# Patient Record
Sex: MALE | Race: WHITE | Employment: OTHER | URBAN - METROPOLITAN AREA
[De-identification: names, ages, dates, MRNs, and addresses within clinical notes are randomized per-mention and may not be internally consistent; named-entity substitution may affect disease eponyms.]

---

## 2018-09-27 ENCOUNTER — HOSPITAL ENCOUNTER (OUTPATIENT)
Dept: PREADMISSION TESTING | Age: 68
Discharge: ANOTHER HOSPITAL | End: 2018-09-27
Attending: COLON & RECTAL SURGERY
Payer: COMMERCIAL

## 2018-09-27 VITALS
SYSTOLIC BLOOD PRESSURE: 143 MMHG | DIASTOLIC BLOOD PRESSURE: 59 MMHG | TEMPERATURE: 98.2 F | WEIGHT: 169.09 LBS | RESPIRATION RATE: 16 BRPM | BODY MASS INDEX: 24.21 KG/M2 | HEART RATE: 67 BPM | HEIGHT: 70 IN | OXYGEN SATURATION: 98 %

## 2018-09-27 LAB
ANION GAP SERPL CALC-SCNC: 6 MMOL/L (ref 5–15)
BUN SERPL-MCNC: 15 MG/DL (ref 6–20)
BUN/CREAT SERPL: 14 (ref 12–20)
CALCIUM SERPL-MCNC: 8.7 MG/DL (ref 8.5–10.1)
CHLORIDE SERPL-SCNC: 100 MMOL/L (ref 97–108)
CO2 SERPL-SCNC: 30 MMOL/L (ref 21–32)
CREAT SERPL-MCNC: 1.05 MG/DL (ref 0.7–1.3)
ERYTHROCYTE [DISTWIDTH] IN BLOOD BY AUTOMATED COUNT: 12.5 % (ref 11.5–14.5)
GLUCOSE SERPL-MCNC: 137 MG/DL (ref 65–100)
HCT VFR BLD AUTO: 40.3 % (ref 36.6–50.3)
HGB BLD-MCNC: 14.8 G/DL (ref 12.1–17)
MCH RBC QN AUTO: 34.5 PG (ref 26–34)
MCHC RBC AUTO-ENTMCNC: 36.7 G/DL (ref 30–36.5)
MCV RBC AUTO: 93.9 FL (ref 80–99)
NRBC # BLD: 0 K/UL (ref 0–0.01)
NRBC BLD-RTO: 0 PER 100 WBC
PLATELET # BLD AUTO: 181 K/UL (ref 150–400)
PMV BLD AUTO: 9.3 FL (ref 8.9–12.9)
POTASSIUM SERPL-SCNC: 4.3 MMOL/L (ref 3.5–5.1)
RBC # BLD AUTO: 4.29 M/UL (ref 4.1–5.7)
SODIUM SERPL-SCNC: 136 MMOL/L (ref 136–145)
WBC # BLD AUTO: 4.4 K/UL (ref 4.1–11.1)

## 2018-09-27 PROCEDURE — 93005 ELECTROCARDIOGRAM TRACING: CPT

## 2018-09-27 PROCEDURE — 36415 COLL VENOUS BLD VENIPUNCTURE: CPT | Performed by: COLON & RECTAL SURGERY

## 2018-09-27 PROCEDURE — 80048 BASIC METABOLIC PNL TOTAL CA: CPT | Performed by: COLON & RECTAL SURGERY

## 2018-09-27 PROCEDURE — 85027 COMPLETE CBC AUTOMATED: CPT | Performed by: COLON & RECTAL SURGERY

## 2018-09-27 RX ORDER — SODIUM CHLORIDE, SODIUM LACTATE, POTASSIUM CHLORIDE, CALCIUM CHLORIDE 600; 310; 30; 20 MG/100ML; MG/100ML; MG/100ML; MG/100ML
25 INJECTION, SOLUTION INTRAVENOUS CONTINUOUS
Status: CANCELLED | OUTPATIENT
Start: 2018-09-27

## 2018-09-27 NOTE — PERIOP NOTES
Patient here for PAT appointment for hemorrhoidectomy tomorrow 9/28/18. CBC and BMP drawn and sent. EKG completed. Pt followed at the [facility].  No cardiologist. METS>4. DANIELLE 2 for age and male. Preop orders in.

## 2018-09-28 ENCOUNTER — ANESTHESIA (OUTPATIENT)
Dept: SURGERY | Age: 68
End: 2018-09-28
Payer: COMMERCIAL

## 2018-09-28 ENCOUNTER — ANESTHESIA EVENT (OUTPATIENT)
Dept: SURGERY | Age: 68
End: 2018-09-28
Payer: COMMERCIAL

## 2018-09-28 ENCOUNTER — HOSPITAL ENCOUNTER (OUTPATIENT)
Age: 68
Setting detail: OUTPATIENT SURGERY
Discharge: HOME | End: 2018-09-28
Attending: COLON & RECTAL SURGERY | Admitting: COLON & RECTAL SURGERY
Payer: COMMERCIAL

## 2018-09-28 VITALS
SYSTOLIC BLOOD PRESSURE: 118 MMHG | TEMPERATURE: 97.7 F | HEART RATE: 70 BPM | DIASTOLIC BLOOD PRESSURE: 55 MMHG | RESPIRATION RATE: 15 BRPM | OXYGEN SATURATION: 97 % | HEIGHT: 70 IN | WEIGHT: 164.24 LBS | BODY MASS INDEX: 23.51 KG/M2

## 2018-09-28 DIAGNOSIS — K64.1 GRADE II HEMORRHOIDS: Primary | ICD-10-CM

## 2018-09-28 PROBLEM — K64.9 HEMORRHOIDS: Status: ACTIVE | Noted: 2018-09-28

## 2018-09-28 LAB
ATRIAL RATE: 62 BPM
CALCULATED P AXIS, ECG09: 39 DEGREES
CALCULATED R AXIS, ECG10: 54 DEGREES
CALCULATED T AXIS, ECG11: 30 DEGREES
DIAGNOSIS, 93000: NORMAL
P-R INTERVAL, ECG05: 170 MS
Q-T INTERVAL, ECG07: 388 MS
QRS DURATION, ECG06: 86 MS
QTC CALCULATION (BEZET), ECG08: 393 MS
VENTRICULAR RATE, ECG03: 62 BPM

## 2018-09-28 PROCEDURE — 77030002888 HC SUT CHRMC J&J -A: Performed by: COLON & RECTAL SURGERY

## 2018-09-28 PROCEDURE — 77030021052 HC RNG RETRCTR STAY COOP -A: Performed by: COLON & RECTAL SURGERY

## 2018-09-28 PROCEDURE — 74011250636 HC RX REV CODE- 250/636: Performed by: ANESTHESIOLOGY

## 2018-09-28 PROCEDURE — 76060000032 HC ANESTHESIA 0.5 TO 1 HR: Performed by: COLON & RECTAL SURGERY

## 2018-09-28 PROCEDURE — 77030002986 HC SUT PROL J&J -A: Performed by: COLON & RECTAL SURGERY

## 2018-09-28 PROCEDURE — 76010000138 HC OR TIME 0.5 TO 1 HR: Performed by: COLON & RECTAL SURGERY

## 2018-09-28 PROCEDURE — 74011250636 HC RX REV CODE- 250/636

## 2018-09-28 PROCEDURE — 76210000006 HC OR PH I REC 0.5 TO 1 HR: Performed by: COLON & RECTAL SURGERY

## 2018-09-28 PROCEDURE — 77030014007 HC SPNG HEMSTAT J&J -B: Performed by: COLON & RECTAL SURGERY

## 2018-09-28 PROCEDURE — 77030031139 HC SUT VCRL2 J&J -A: Performed by: COLON & RECTAL SURGERY

## 2018-09-28 PROCEDURE — 74011250637 HC RX REV CODE- 250/637: Performed by: ANESTHESIOLOGY

## 2018-09-28 PROCEDURE — 76210000020 HC REC RM PH II FIRST 0.5 HR: Performed by: COLON & RECTAL SURGERY

## 2018-09-28 PROCEDURE — 77030011640 HC PAD GRND REM COVD -A: Performed by: COLON & RECTAL SURGERY

## 2018-09-28 PROCEDURE — 74011000250 HC RX REV CODE- 250: Performed by: COLON & RECTAL SURGERY

## 2018-09-28 PROCEDURE — 77030018836 HC SOL IRR NACL ICUM -A: Performed by: COLON & RECTAL SURGERY

## 2018-09-28 PROCEDURE — 77030020782 HC GWN BAIR PAWS FLX 3M -B

## 2018-09-28 PROCEDURE — 77030013629 HC ELECTRD NDL STRY -B: Performed by: COLON & RECTAL SURGERY

## 2018-09-28 PROCEDURE — 88305 TISSUE EXAM BY PATHOLOGIST: CPT | Performed by: COLON & RECTAL SURGERY

## 2018-09-28 PROCEDURE — 88304 TISSUE EXAM BY PATHOLOGIST: CPT | Performed by: COLON & RECTAL SURGERY

## 2018-09-28 RX ORDER — OXYCODONE HYDROCHLORIDE 5 MG/1
5 TABLET ORAL
Qty: 40 TAB | Refills: 0 | Status: ON HOLD | OUTPATIENT
Start: 2018-09-28 | End: 2018-12-07

## 2018-09-28 RX ORDER — MIDAZOLAM HYDROCHLORIDE 1 MG/ML
1 INJECTION, SOLUTION INTRAMUSCULAR; INTRAVENOUS AS NEEDED
Status: DISCONTINUED | OUTPATIENT
Start: 2018-09-28 | End: 2018-09-28 | Stop reason: HOSPADM

## 2018-09-28 RX ORDER — MIDAZOLAM HYDROCHLORIDE 1 MG/ML
0.5 INJECTION, SOLUTION INTRAMUSCULAR; INTRAVENOUS
Status: DISCONTINUED | OUTPATIENT
Start: 2018-09-28 | End: 2018-09-28 | Stop reason: HOSPADM

## 2018-09-28 RX ORDER — LIDOCAINE HYDROCHLORIDE 20 MG/ML
INJECTION, SOLUTION EPIDURAL; INFILTRATION; INTRACAUDAL; PERINEURAL AS NEEDED
Status: DISCONTINUED | OUTPATIENT
Start: 2018-09-28 | End: 2018-09-28 | Stop reason: HOSPADM

## 2018-09-28 RX ORDER — SODIUM CHLORIDE, SODIUM LACTATE, POTASSIUM CHLORIDE, CALCIUM CHLORIDE 600; 310; 30; 20 MG/100ML; MG/100ML; MG/100ML; MG/100ML
75 INJECTION, SOLUTION INTRAVENOUS CONTINUOUS
Status: DISCONTINUED | OUTPATIENT
Start: 2018-09-28 | End: 2018-09-28 | Stop reason: HOSPADM

## 2018-09-28 RX ORDER — OXYCODONE AND ACETAMINOPHEN 5; 325 MG/1; MG/1
1 TABLET ORAL AS NEEDED
Status: DISCONTINUED | OUTPATIENT
Start: 2018-09-28 | End: 2018-09-28 | Stop reason: HOSPADM

## 2018-09-28 RX ORDER — SODIUM CHLORIDE 0.9 % (FLUSH) 0.9 %
5-10 SYRINGE (ML) INJECTION AS NEEDED
Status: DISCONTINUED | OUTPATIENT
Start: 2018-09-28 | End: 2018-09-28 | Stop reason: HOSPADM

## 2018-09-28 RX ORDER — LIDOCAINE HYDROCHLORIDE 10 MG/ML
0.1 INJECTION, SOLUTION EPIDURAL; INFILTRATION; INTRACAUDAL; PERINEURAL AS NEEDED
Status: DISCONTINUED | OUTPATIENT
Start: 2018-09-28 | End: 2018-09-28 | Stop reason: HOSPADM

## 2018-09-28 RX ORDER — SODIUM CHLORIDE 0.9 % (FLUSH) 0.9 %
5-10 SYRINGE (ML) INJECTION EVERY 8 HOURS
Status: DISCONTINUED | OUTPATIENT
Start: 2018-09-28 | End: 2018-09-28 | Stop reason: HOSPADM

## 2018-09-28 RX ORDER — FENTANYL CITRATE 50 UG/ML
INJECTION, SOLUTION INTRAMUSCULAR; INTRAVENOUS AS NEEDED
Status: DISCONTINUED | OUTPATIENT
Start: 2018-09-28 | End: 2018-09-28 | Stop reason: HOSPADM

## 2018-09-28 RX ORDER — DIPHENHYDRAMINE HYDROCHLORIDE 50 MG/ML
12.5 INJECTION, SOLUTION INTRAMUSCULAR; INTRAVENOUS AS NEEDED
Status: DISCONTINUED | OUTPATIENT
Start: 2018-09-28 | End: 2018-09-28 | Stop reason: HOSPADM

## 2018-09-28 RX ORDER — HYDROMORPHONE HYDROCHLORIDE 1 MG/ML
0.2 INJECTION, SOLUTION INTRAMUSCULAR; INTRAVENOUS; SUBCUTANEOUS
Status: DISCONTINUED | OUTPATIENT
Start: 2018-09-28 | End: 2018-09-28 | Stop reason: HOSPADM

## 2018-09-28 RX ORDER — FENTANYL CITRATE 50 UG/ML
50 INJECTION, SOLUTION INTRAMUSCULAR; INTRAVENOUS AS NEEDED
Status: DISCONTINUED | OUTPATIENT
Start: 2018-09-28 | End: 2018-09-28 | Stop reason: HOSPADM

## 2018-09-28 RX ORDER — PROPOFOL 10 MG/ML
INJECTION, EMULSION INTRAVENOUS AS NEEDED
Status: DISCONTINUED | OUTPATIENT
Start: 2018-09-28 | End: 2018-09-28 | Stop reason: HOSPADM

## 2018-09-28 RX ORDER — SODIUM CHLORIDE 9 MG/ML
50 INJECTION, SOLUTION INTRAVENOUS CONTINUOUS
Status: DISCONTINUED | OUTPATIENT
Start: 2018-09-28 | End: 2018-09-28 | Stop reason: HOSPADM

## 2018-09-28 RX ORDER — MORPHINE SULFATE 10 MG/ML
2 INJECTION, SOLUTION INTRAMUSCULAR; INTRAVENOUS
Status: DISCONTINUED | OUTPATIENT
Start: 2018-09-28 | End: 2018-09-28 | Stop reason: HOSPADM

## 2018-09-28 RX ORDER — SODIUM CHLORIDE, SODIUM LACTATE, POTASSIUM CHLORIDE, CALCIUM CHLORIDE 600; 310; 30; 20 MG/100ML; MG/100ML; MG/100ML; MG/100ML
25 INJECTION, SOLUTION INTRAVENOUS CONTINUOUS
Status: DISCONTINUED | OUTPATIENT
Start: 2018-09-28 | End: 2018-09-28 | Stop reason: HOSPADM

## 2018-09-28 RX ORDER — PROPOFOL 10 MG/ML
INJECTION, EMULSION INTRAVENOUS
Status: DISCONTINUED | OUTPATIENT
Start: 2018-09-28 | End: 2018-09-28 | Stop reason: HOSPADM

## 2018-09-28 RX ORDER — ONDANSETRON 2 MG/ML
4 INJECTION INTRAMUSCULAR; INTRAVENOUS AS NEEDED
Status: DISCONTINUED | OUTPATIENT
Start: 2018-09-28 | End: 2018-09-28 | Stop reason: HOSPADM

## 2018-09-28 RX ORDER — FENTANYL CITRATE 50 UG/ML
25 INJECTION, SOLUTION INTRAMUSCULAR; INTRAVENOUS
Status: DISCONTINUED | OUTPATIENT
Start: 2018-09-28 | End: 2018-09-28 | Stop reason: HOSPADM

## 2018-09-28 RX ORDER — BUPIVACAINE HYDROCHLORIDE AND EPINEPHRINE 2.5; 5 MG/ML; UG/ML
INJECTION, SOLUTION EPIDURAL; INFILTRATION; INTRACAUDAL; PERINEURAL AS NEEDED
Status: DISCONTINUED | OUTPATIENT
Start: 2018-09-28 | End: 2018-09-28 | Stop reason: HOSPADM

## 2018-09-28 RX ADMIN — SODIUM CHLORIDE, SODIUM LACTATE, POTASSIUM CHLORIDE, AND CALCIUM CHLORIDE 25 ML/HR: 600; 310; 30; 20 INJECTION, SOLUTION INTRAVENOUS at 11:25

## 2018-09-28 RX ADMIN — PROPOFOL 30 MG: 10 INJECTION, EMULSION INTRAVENOUS at 12:17

## 2018-09-28 RX ADMIN — PROPOFOL 50 MG: 10 INJECTION, EMULSION INTRAVENOUS at 12:04

## 2018-09-28 RX ADMIN — FENTANYL CITRATE 25 MCG: 50 INJECTION, SOLUTION INTRAMUSCULAR; INTRAVENOUS at 12:04

## 2018-09-28 RX ADMIN — FENTANYL CITRATE 25 MCG: 50 INJECTION, SOLUTION INTRAMUSCULAR; INTRAVENOUS at 12:10

## 2018-09-28 RX ADMIN — OXYCODONE HYDROCHLORIDE AND ACETAMINOPHEN 1 TABLET: 5; 325 TABLET ORAL at 13:14

## 2018-09-28 RX ADMIN — FENTANYL CITRATE 25 MCG: 50 INJECTION, SOLUTION INTRAMUSCULAR; INTRAVENOUS at 13:08

## 2018-09-28 RX ADMIN — LIDOCAINE HYDROCHLORIDE 60 MG: 20 INJECTION, SOLUTION EPIDURAL; INFILTRATION; INTRACAUDAL; PERINEURAL at 12:04

## 2018-09-28 RX ADMIN — PROPOFOL 100 MCG/KG/MIN: 10 INJECTION, EMULSION INTRAVENOUS at 12:04

## 2018-09-28 NOTE — PERIOP NOTES
Discharge instructions reviewed with patient and his wife. Opportunity for questions/answers given, able to verbalize understanding. Provided with 1 prescription for pain medication. Denies need to void prior to discharge. Sitz bath provided with instructions. PIV removed. Escorted out for discharge home via wheelchair by nurse.
EKG reviewed by BRITTANY Sloan NP, okay to proceed with surgery.
Handoff Report from Operating Room to PACU Report received from ALEXANDRE Tripp RN and Carl Schneider CRNA regarding Mandi Carrion. Surgeon(s): 
Fatemeh Chang MD  And Procedure(s) (LRB): 
TRANSANAL EXCISION OF HEMORRHOIDS AND POLYP (N/A)  confirmed  
with allergies, dressings and rectal gelfoam packing discussed. Anesthesia type, drugs, patient history, complications, estimated blood loss, vital signs, intake and output, and last pain medication, lines and temperature were reviewed.
Patient meets discharge criteria. Phase 2 unavailable at present. Monitors removed. Patient OOB with standby assist & steady gait. Denies dizziness or nausea. Dressed with minimal assist.  Transferred to wheelchair. Amaury need to void. Dressing D&I. Transferred to phase 2 via WC.
Wife in [place] updated via phone.
Alert and oriented x 3, normal mood and affect, no apparent risk to self or others.

## 2018-09-28 NOTE — IP AVS SNAPSHOT
[address] [place] [facility]. 
[phone] Patient: Jacobo Moreland MRN: [id number] DYR:[birth year]/36[id number] A check rowdy indicates which time of day the medication should be taken. My Medications START taking these medications Instructions Each Dose to Equal  
 Morning Noon Evening Bedtime  
 oxyCODONE IR 5 mg immediate release tablet Commonly known as:  Beena Fines Your last dose was: Your next dose is: Take 1 Tab by mouth every four (4) hours as needed for Pain. Max Daily Amount: 30 mg.  
 5 mg CONTINUE taking these medications Instructions Each Dose to Equal  
 Morning Noon Evening Bedtime MULTIVITAMIN PO Your last dose was: Your next dose is: Take 1 Tab by mouth daily. 1 Tab VITAMIN B-12 PO Your last dose was: Your next dose is: Take 1 Tab by mouth daily. 1 Tab VITAMIN E PO Your last dose was: Your next dose is: Take 1 Tab by mouth daily. 1 Tab Where to Get Your Medications Information on where to get these meds will be given to you by the nurse or doctor. ! Ask your nurse or doctor about these medications  
  oxyCODONE IR 5 mg immediate release tablet

## 2018-09-28 NOTE — ANESTHESIA POSTPROCEDURE EVALUATION
Post-Anesthesia Evaluation and Assessment Patient: Darrell Fleming MRN: [id number]  SSN: [id number] YOB: 1950  Age: 77 y.o. Sex: male Cardiovascular Function/Vital Signs Visit Vitals  BP (P) 135/77 (BP 1 Location: Left arm, BP Patient Position: At rest;Head of bed elevated (Comment degrees))  Pulse 67  Temp (P) 36.4 °C (97.5 °F)  Resp 14  
 Ht 5' 10\" (1.778 m)  Wt 74.5 kg (164 lb 3.9 oz)  SpO2 97%  BMI 23.57 kg/m2 Patient is status post general anesthesia for Procedure(s): 
TRANSANAL EXCISION OF HEMORRHOIDS AND POLYP. Nausea/Vomiting: None Postoperative hydration reviewed and adequate. Pain: 
Pain Scale 1: (P) Numeric (0 - 10) (09/28/18 1315) Pain Intensity 1: (P) 2 (09/28/18 1315) Managed Neurological Status:  
Neuro (WDL): (P) Exceptions to WDL (09/28/18 1236) At baseline Mental Status and Level of Consciousness: Arousable Pulmonary Status:  
O2 Device: (P) Room air (09/28/18 1315) Adequate oxygenation and airway patent Complications related to anesthesia: None Post-anesthesia assessment completed. No concerns Signed By: Laverne Miller MD   
 September 28, 2018

## 2018-09-28 NOTE — ANESTHESIA PREPROCEDURE EVALUATION
Anesthetic History PONV Review of Systems / Medical History Patient summary reviewed, nursing notes reviewed and pertinent labs reviewed Pulmonary Within defined limits Neuro/Psych Within defined limits Cardiovascular Within defined limits Exercise tolerance: >4 METS 
  
GI/Hepatic/Renal 
Within defined limits Endo/Other Arthritis Other Findings Physical Exam 
 
Airway Mallampati: II 
TM Distance: 4 - 6 cm Neck ROM: normal range of motion Mouth opening: Normal 
 
 Cardiovascular Regular rate and rhythm,  S1 and S2 normal,  no murmur, click, rub, or gallop Dental 
 
Dentition: Implants Pulmonary Breath sounds clear to auscultation Abdominal 
GI exam deferred Other Findings Anesthetic Plan ASA: 2 Anesthesia type: general 
 
Monitoring Plan: BIS Induction: Intravenous Anesthetic plan and risks discussed with: Patient

## 2018-09-28 NOTE — OP NOTES
Preop dx: hemorrhoids  Postop dx: same  Procedure: Excisional hemorrhoidectomy  Surgeon: Dr. Neto Whitt  Anesthesia: mac +30cc 0.25%marcaine with epi  EBL: 1cc  Specimen: left lateral internal/external thrombosed hemorrhoid, left lateral distal rectal polyp  Complications:  none apparent  Condition: stable to recovery room    Indications: thrombosed internal/external hemorrhoid    The patient was taken to the OR after being consented. After smooth induction of anesthesia, the patient was positioned prone on the table. All extremities were padded. Time out was performed. Local anesthetic was infiltrated for a local block. The patient was found to have 1 hemorrhoids. The left lateral column was addressed first. A large hill mccormick anoscope was inserted. The skin was elevated and a v-shaped incision was made on the perineum. The hemorrhoid was undermined taking care to avoid injury to the underlying sphincter complex. The dissection was taken to the apex of the hemorrhoid and ligated with a 3-0 chromic. The incision was closed using a running locking stitch in the anal canal and a running simple suture on the anal margin. This was hemostatic. A 7mm polyp on a stalk was found in the distal rectum on the left lateral sidewall. This was removed with electrocautery and sent to pathology. Gel foam was applied. Sterile gauze was applied. The instrument and sponge counts were correct x2. The patient tolerated the procedure well and was transferred to the recovery room in stable condition.

## 2018-09-28 NOTE — IP AVS SNAPSHOT
[address] [facility] 
[phone] Patient: Mariella Elena MRN: [id number] HXB:3/99[birth year] About your hospitalization You were admitted on:  September 28, 2018 You last received care in the:  [facility] You were discharged on:  September 28, 2018 Why you were hospitalized Your primary diagnosis was:  Not on File Your diagnoses also included:  Hemorrhoids Follow-up Information Follow up With Details Comments Contact Info Catherine Logan MD   [facility] [address] [phone] 
[phone] Discharge Orders None A check rowdy indicates which time of day the medication should be taken. My Medications START taking these medications Instructions Each Dose to Equal  
 Morning Noon Evening Bedtime  
 oxyCODONE IR 5 mg immediate release tablet Commonly known as:  Vira Reasons Your last dose was: Your next dose is: Take 1 Tab by mouth every four (4) hours as needed for Pain. Max Daily Amount: 30 mg.  
 5 mg CONTINUE taking these medications Instructions Each Dose to Equal  
 Morning Noon Evening Bedtime MULTIVITAMIN PO Your last dose was: Your next dose is: Take 1 Tab by mouth daily. 1 Tab VITAMIN B-12 PO Your last dose was: Your next dose is: Take 1 Tab by mouth daily. 1 Tab VITAMIN E PO Your last dose was: Your next dose is: Take 1 Tab by mouth daily. 1 Tab Where to Get Your Medications Information on where to get these meds will be given to you by the nurse or doctor. ! Ask your nurse or doctor about these medications  
  oxyCODONE IR 5 mg immediate release tablet Opioid Education Prescription Opioids: What You Need to Know: 
 
 
[facility] Discharge Instructions for Ambulatory 23-Hour Surgery Patients 1. Advance to high fiber diet as tolerated. 2. Take Metamucil/Citrucel 1 teaspoon mixed in a glass of water in AM and PM. 
3. Remove dressing at 6PM. 4. Take 2 sitz baths today (warm water baths for 15-20 minutes). Begin four (4) times a day the day after surgery. 5. Apply Nupercainal Ointment (does not need a prescription) to the anal area after sitz baths, place a dry 4x4 gauze over the are between the buttocks. 6. Take pain medication as prescribed. (NO DRIVING WHILE ON PAIN MEDICATION). 7. No lifting any objects weighing more than 40 pounds. 8. No sitting more than 45 minutes without standing, walking, or lying down. 9. You may walk as desired. 10.  Please schedule an appointment in the office within 10-14 days. Call ahead to schedule your appointment [phone]. 11.  Call Exchange [phone] if you have any problems or questions after   hours. 12.  Expect slight bright red blood up to four (4) weeks from surgery. 15.  Stitches are the dissolving type  they do not need removal in the office. 14.  If no bowel movement by 9/30/2018, take 30cc (1 tablespoon) of Milk of Magnesia. Repeat if no results. If still no bowel movement the next day, then call the office. DISCHARGE SUMMARY from Nurse PATIENT INSTRUCTIONS: 
 
 After general anesthesia or intravenous sedation, for 24 hours or while taking prescription Narcotics: · Limit your activities · Do not drive and operate hazardous machinery · Do not make important personal or business decisions · Do  not drink alcoholic beverages · If you have not urinated within 8 hours after discharge, please contact your surgeon on call. Report the following to your surgeon: 
· Excessive pain, swelling, redness or odor of or around the surgical area · Temperature over 100.5 · Nausea and vomiting lasting longer than 4 hours or if unable to take medications · Any signs of decreased circulation or nerve impairment to extremity: change in color, persistent  numbness, tingling, coldness or increase pain · Any questions What to do at Home: A common side effect of anesthesia following surgery is nausea and/or vomiting. In order to decrease symptoms, it is wise to avoid foods that are high in fat, greasy foods, milk products, and spicy foods for the first 24 hours. Acceptable foods for the first 24 hours following surgery include but are not limited to: 
 
? soup 
? broth 
?  toast  
? crackers ? applesauce 
? bananas  
? mashed potatoes, 
? soft or scrambled eggs 
? oatmeal 
?  jello It is important to eat when taking your pain medication. This will help to prevent nausea. If possible, please try to time your meals with your medications. It is very important to stay hydrated following surgery. Sip fluids frequently while awake. Avoid acidic drinks such as citrus juices and soda for 24 hours. Carbonated beverages may cause bloating and gas. Acceptable fluids include: 
 
? water (flavor packets may add variety) ? coffee or tea (in moderation) ? Gatorade ? Allison Galeazzi ? apple juice 
? cranberry juice You are encouraged to cough and deep breathe every hour when awake. This will help to prevent respiratory complications following anesthesia.  You may want to hug a pillow when coughing and sneezing to add additional support to the surgical area and to decrease discomfort if you had abdominal or chest surgery. If you are discharged home with support stockings, you may remove them after 24 hours. Support stockings are used to help prevent blood clots in the legs following surgery. Please take time to review all of your Home Care Instructions and Medication Information sheets provided in your discharge packet. If you have any questions, please contact your surgeons office. Thank you. TO PREVENT AN INFECTION 1. [address] YOUR HANDS 
 
? To prevent infection, good handwashing is the most important thing you or your caregiver can do.   
 
? Wash your hands with soap and water or use the hand  we gave you before you touch any wounds. 2. SHOWER ? Use the antibacterial soap we gave you when you take a shower. ? Shower with this soap until your wounds are healed. ? To reach all areas of your body, you may need someone to help you. ? Dont forget to clean your belly button with every shower. 3.  USE CLEAN SHEETS 
 
? Use freshly cleaned sheets on your bed after surgery. ? To keep the surgery site clean, do not allow pets to sleep with you while your wound is still healing. 4. STOP SMOKING ? Stop smoking, or at least cut back on smoking ? Smoking slows your healing. 5.  CONTROL YOUR BLOOD SUGAR 
 
? High blood sugars slow wound healing. If you are diabetic, control your blood sugar levels before and after your surgery. Oxycodone, Rapid Release (ETH-Oxydose, Oxy IR, Roxicodone) - (By mouth) Why this medicine is used:  
Treats moderate to severe pain. This medicine is a narcotic pain reliever. Contact a nurse or doctor right away if you have: 
· Fast or slow heart beat, shallow breathing, blue lips, skin or fingernails · Anxiety, restlessness, fever, sweating, muscle spasms, twitching, seeing or hearing things that are not there · Extreme weakness, shallow breathing, slow heartbeat · Severe confusion, lightheadedness, dizziness, fainting · Sweating or cold, clammy skin, seizures · Severe constipation, stomach pain, nausea, vomiting Common side effects: · Mild constipation · Sleepiness, tiredness © 2017 [facility] Information is for End User's use only and may not be sold, redistributed or otherwise used for commercial purposes. Please carry medication information at all times in case of emergency situations. These are general instructions for a healthy lifestyle: No smoking/ No tobacco products/ Avoid exposure to second hand smoke Surgeon General's Warning:  Quitting smoking now greatly reduces serious risk to your health. Obesity, smoking, and sedentary lifestyle greatly increases your risk for illness A healthy diet, regular physical exercise & weight monitoring are important for maintaining a healthy lifestyle You may be retaining fluid if you have a history of heart failure or if you experience any of the following symptoms:  Weight gain of 3 pounds or more overnight or 5 pounds in a week, increased swelling in our hands or feet or shortness of breath while lying flat in bed. Please call your doctor as soon as you notice any of these symptoms; do not wait until your next office visit. Recognize signs and symptoms of STROKE: 
 
F-face looks uneven A-arms unable to move or move unevenly S-speech slurred or non-existent T-time-call 911 as soon as signs and symptoms begin-DO NOT go Back to bed or wait to see if you get better-TIME IS BRAIN. Warning Signs of HEART ATTACK Call 911 if you have these symptoms: 
? Chest discomfort. Most heart attacks involve discomfort in the center of the chest that lasts more than a few minutes, or that goes away and comes back. It can feel like uncomfortable pressure, squeezing, fullness, or pain. ? Discomfort in other areas of the upper body. Symptoms can include pain or discomfort in one or both arms, the back, neck, jaw, or stomach. ? Shortness of breath with or without chest discomfort. ? Other signs may include breaking out in a cold sweat, nausea, or lightheadedness. Don't wait more than five minutes to call [address]! Fast action can save your life. Calling 911 is almost always the fastest way to get lifesaving treatment. Emergency Medical Services staff can begin treatment when they arrive  up to an hour sooner than if someone gets to the hospital by car. The discharge information has been reviewed with the patient and caregiver. The patient and caregiver verbalized understanding. Discharge medications reviewed with the patient and caregiver and appropriate educational materials and side effects teaching were provided. Introducing [facility]! [facility] introduces [employer] patient portal. Now you can access parts of your medical record, email your doctor's office, and request medication refills online. 1. In your internet browser, go to [email or web address] 2. Click on the First Time User? Click Here link in the Sign In box. You will see the New Member Sign Up page. 3. Enter your [employer] Access Code exactly as it appears below. You will not need to use this code after youve completed the sign-up process. If you do not sign up before the expiration date, you must request a new code. · [employer] Access Code: [email or web address] Expires: 12/27/2018 10:34 AM 
 
4. Enter the last four digits of your Social Security Number (xxxx) and Date of Birth (mm/dd/yyyy) as indicated and click Submit. You will be taken to the next sign-up page. 5. Create a [employer] ID. This will be your [employer] login ID and cannot be changed, so think of one that is secure and easy to remember. 6. Create a [employer] password. You can change your password at any time. 7. Enter your Password Reset Question and Answer. This can be used at a later time if you forget your password. 8. Enter your e-mail address. You will receive e-mail notification when new information is available in [address] 9. Click Sign Up. You can now view and download portions of your medical record. 10. Click the Download Summary menu link to download a portable copy of your medical information. If you have questions, please visit the Frequently Asked Questions section of the [employer] website. Remember, [employer] is NOT to be used for urgent needs. For medical emergencies, dial 911. Now available from your iPhone and Android! Introducing Humberto Xiao As a [facility] patient, I wanted to make you aware of our electronic visit tool called Humberto Xiao. [employer]Socialthing allows you to connect within minutes with a medical provider 24 hours a day, seven days a week via a mobile device or tablet or logging into a secure website from your computer. You can access Humberto Xiao from anywhere in the [place]. A virtual visit might be right for you when you have a simple condition and feel like you just dont want to get out of bed, or cant get away from work for an appointment, when your regular [facility] provider is not available (evenings, weekends or holidays), or when youre out of town and need minor care. Electronic visits cost only $49 and if the [employer] provider determines a prescription is needed to treat your condition, one can be electronically transmitted to a nearby pharmacy*. Please take a moment to enroll today if you have not already done so. The enrollment process is free and takes just a few minutes. To enroll, please download the [employer] alden to your tablet or phone, or visit [email or web address] to enroll on your computer. And, as an [address] [place] [address] patient with a [employer] account, the results of your visits will be scanned into your electronic medical record and your primary care provider will be able to view the scanned results. We urge you to continue to see your regular [facility] provider for your ongoing medical care. And while your primary care provider may not be the one available when you seek a Humberto Xiao virtual visit, the peace of mind you get from getting a real diagnosis real time can be priceless. For more information on Humberto Xiao, view our Frequently Asked Questions (FAQs) at [email or web address]. Sincerely, 
 
Iain Honeycutt MD 
Chief Medical Officer Refugio Campos *:  certain medications cannot be prescribed via Humberto Connollyzoefin Providers Seen During Your Hospitalization Provider Specialty Primary office phone Devin Darnell MD Colon [facility] Rectal Surgery [phone] Your Primary Care Physician (PCP) Primary Care Physician Office Phone Office Fax Crystal Lupillo [phone] You are allergic to the following No active allergies Recent Documentation Height Weight BMI Smoking Status 1.778 m 74.5 kg 23.57 kg/m2 Never Smoker Emergency Contacts Name Discharge Info Relation Home Work Mobile [facility] DISCHARGE CAREGIVER [3] Spouse [3] [phone] Patient Belongings The following personal items are in your possession at time of discharge: 
  Dental Appliances: None  Visual Aid: Glasses      Home Medications: None   Jewelry: None  Clothing: Undergarments, Pants, Shirt, Footwear, Socks    Other Valuables: Eyeglasses, Other (comment) (book)  Personal Items Sent to Safe: declined Please provide this summary of care documentation to your next provider.  
  
  
 
  
Signatures-by signing, you are acknowledging that this After Visit Summary has been reviewed with you and you have received a copy. Patient Signature:  ____________________________________________________________ Date:  ____________________________________________________________  
  
Lesly Stake Provider Signature:  ____________________________________________________________ Date:  ____________________________________________________________

## 2018-09-28 NOTE — H&P
Colon and Rectal Surgery History and Physical 
 
Subjective:  
  
Gigi Flores is a 77 y.o. male who has hemorrhoids There are no active problems to display for this patient. Past Medical History:  
Diagnosis Date  Arthritis  Cancer [facility])   
 prostate  Ill-defined condition   
 bilateral cataracts  Nausea & vomiting Past Surgical History:  
Procedure Laterality Date  HX ORTHOPAEDIC Bilateral   
 multiple knee surgeries, mostly related to fibrosis  HX PROSTATECTOMY  2005  
 prostatectomy Social History Substance Use Topics  Smoking status: Never Smoker  Smokeless tobacco: Never Used  Alcohol use 3.6 oz/week 6 Glasses of wine per week Family History Problem Relation Age of Onset  Lung Disease Mother   
  lung failure, age 76  Cancer Father   
  throat, smoker, age 64 Prior to Admission medications Medication Sig Start Date End Date Taking? Authorizing Provider MULTIVITAMIN PO Take 1 Tab by mouth daily. Historical Provider  
cyanocobalamin, vitamin B-12, (VITAMIN B-12 PO) Take 1 Tab by mouth daily. Historical Provider  
vitamin E acetate (VITAMIN E PO) Take 1 Tab by mouth daily. Historical Provider No Known Allergies Review of Systems: A comprehensive review of systems was negative except for that written in the History of Present Illness. Objective:  
 
Visit Vitals  /74 (BP 1 Location: Right arm, BP Patient Position: At rest)  Pulse 69  Temp 97.9 °F (36.6 °C)  Resp 18  Ht 5' 10\" (1.778 m)  Wt 74.5 kg (164 lb 3.9 oz)  SpO2 99%  BMI 23.57 kg/m2 Physical Exam:  
nad Chest clear Heart reg 
abd soft Imaging:  images and reports reviewed Lab Review:   
Recent Results (from the past 24 hour(s)) EKG, 12 LEAD, INITIAL Collection Time: 09/27/18  1:13 PM  
Result Value Ref Range Ventricular Rate 62 BPM  
 Atrial Rate 62 BPM  
 P-R Interval 170 ms  QRS Duration 86 ms  
 Q-T Interval 388 ms QTC Calculation (Bezet) 393 ms Calculated P Axis 39 degrees Calculated R Axis 54 degrees Calculated T Axis 30 degrees Diagnosis Sinus rhythm with premature atrial complexes Confirmed by Prateek Eaton ([id number]) on 9/28/2018 3:37:58 AM 
  
METABOLIC PANEL, BASIC Collection Time: 09/27/18  1:29 PM  
Result Value Ref Range Sodium 136 136 - 145 mmol/L Potassium 4.3 3.5 - 5.1 mmol/L Chloride 100 97 - 108 mmol/L  
 CO2 30 21 - 32 mmol/L Anion gap 6 5 - 15 mmol/L Glucose 137 (H) 65 - 100 mg/dL BUN 15 6 - 20 MG/DL Creatinine 1.05 0.70 - 1.30 MG/DL  
 BUN/Creatinine ratio 14 12 - 20 GFR est AA >60 >60 ml/min/1.73m2 GFR est non-AA >60 >60 ml/min/1.73m2 Calcium 8.7 8.5 - 10.1 MG/DL  
CBC W/O DIFF Collection Time: 09/27/18  1:29 PM  
Result Value Ref Range WBC 4.4 4.1 - 11.1 K/uL  
 RBC 4.29 4. 10 - 5.70 M/uL  
 HGB 14.8 12.1 - 17.0 g/dL HCT 40.3 36.6 - 50.3 % MCV 93.9 80.0 - 99.0 FL  
 MCH 34.5 (H) 26.0 - 34.0 PG  
 MCHC 36.7 (H) 30.0 - 36.5 g/dL  
 RDW 12.5 11.5 - 14.5 % PLATELET 439 129 - 546 K/uL MPV 9.3 8.9 - 12.9 FL  
 NRBC 0.0 0  WBC ABSOLUTE NRBC 0.00 0.00 - 0.01 K/uL Labs and radiology: images and reports reviewed Assessment:  
 
hemorrhoids Plan: 1. I recommend proceeding with excisional hemorrhoidectomy. Treatment alternatives were discussed. 2. Discussed aspects of surgical intervention, methods, risks (including by not limited to infection, bleeding, hematoma, and perforation of the intestines or solid organs), and the risks of general anesthetic. The patient understands the risks; any and all questions were answered to the patient's satisfaction. Signed By: Tucker Huose MD   
 September 28, 2018

## 2018-09-28 NOTE — DISCHARGE INSTRUCTIONS
Ben Levin MD, FACS  [place] C. Kisha George MD, FACS  Edwige Maria M. Sue Guzmán MD, [address] Nadine Salazar MD, [address] [facility] Catrachito [place] MD, FACS  Keenan Ogden. MD Kat Strickland MD    [facility] Discharge Instructions for Ambulatory 23-Hour Surgery Patients    1. Advance to high fiber diet as tolerated. 2. Take Metamucil/Citrucel 1 teaspoon mixed in a glass of water in AM and PM.  3. Remove dressing at 6PM.  4. Take 2 sitz baths today (warm water baths for 15-20 minutes). Begin four (4) times a day the day after surgery. 5. Apply Nupercainal Ointment (does not need a prescription) to the anal area after sitz baths, place a dry 4x4 gauze over the are between the buttocks. 6. Take pain medication as prescribed. (NO DRIVING WHILE ON PAIN MEDICATION). 7. No lifting any objects weighing more than 40 pounds. 8. No sitting more than 45 minutes without standing, walking, or lying down. 9. You may walk as desired. 10.  Please schedule an appointment in the office within 10-14 days. Call ahead to schedule your appointment [phone]. 11.  Call [facility] [phone] if you have any problems or questions after   hours. 12.  Expect slight bright red blood up to four (4) weeks from surgery. 13.  Stitches are the dissolving type - they do not need removal in the office. 14.  If no bowel movement by 9/30/2018, take 30cc (1 tablespoon) of Milk of Magnesia. Repeat if no results. If still no bowel movement the next day, then call the office. DISCHARGE SUMMARY from Nurse    PATIENT INSTRUCTIONS:    After general anesthesia or intravenous sedation, for 24 hours or while taking prescription Narcotics:  · Limit your activities  · Do not drive and operate hazardous machinery  · Do not make important personal or business decisions  · Do  not drink alcoholic beverages  · If you have not urinated within 8 hours after discharge, please contact your surgeon on call.     Report the following to your surgeon:  · Excessive pain, swelling, redness or odor of or around the surgical area  · Temperature over 100.5  · Nausea and vomiting lasting longer than 4 hours or if unable to take medications  · Any signs of decreased circulation or nerve impairment to extremity: change in color, persistent  numbness, tingling, coldness or increase pain  · Any questions    What to do at Home:  A common side effect of anesthesia following surgery is nausea and/or vomiting. In order to decrease symptoms, it is wise to avoid foods that are high in fat, greasy foods, milk products, and spicy foods for the first 24 hours. Acceptable foods for the first 24 hours following surgery include but are not limited to:     soup   broth    toast    crackers    applesauce    bananas    mashed potatoes,   soft or scrambled eggs   oatmeal    jello    It is important to eat when taking your pain medication. This will help to prevent nausea. If possible, please try to time your meals with your medications. It is very important to stay hydrated following surgery. Sip fluids frequently while awake. Avoid acidic drinks such as citrus juices and soda for 24 hours. Carbonated beverages may cause bloating and gas. Acceptable fluids include:    - water (flavor packets may add variety)  - coffee or tea (in moderation)  - Gatorade  - Maicol-aid  - apple juice  - cranberry juice    You are encouraged to cough and deep breathe every hour when awake. This will help to prevent respiratory complications following anesthesia. You may want to hug a pillow when coughing and sneezing to add additional support to the surgical area and to decrease discomfort if you had abdominal or chest surgery. If you are discharged home with support stockings, you may remove them after 24 hours. Support stockings are used to help prevent blood clots in the legs following surgery.     Please take time to review all of your Home Care Instructions and Medication Information sheets provided in your discharge packet. If you have any questions, please contact your surgeons office. Thank you. TO PREVENT AN INFECTION      1. [address] YOUR HANDS     To prevent infection, good handwashing is the most important thing you or your caregiver can do.  Wash your hands with soap and water or use the hand  we gave you before you touch any wounds. 2. SHOWER     Use the antibacterial soap we gave you when you take a shower.  Shower with this soap until your wounds are healed.  To reach all areas of your body, you may need someone to help you.  Dont forget to clean your belly button with every shower. 3.  USE CLEAN SHEETS     Use freshly cleaned sheets on your bed after surgery.  To keep the surgery site clean, do not allow pets to sleep with you while your wound is still healing. 4. STOP SMOKING     Stop smoking, or at least cut back on smoking     Smoking slows your healing. 5.  CONTROL YOUR BLOOD SUGAR     High blood sugars slow wound healing. If you are diabetic, control your blood sugar levels before and after your surgery. Oxycodone, Rapid Release (ETH-Oxydose, Oxy IR, Roxicodone) - (By mouth)   Why this medicine is used:   Treats moderate to severe pain. This medicine is a narcotic pain reliever.   Contact a nurse or doctor right away if you have:  · Fast or slow heart beat, shallow breathing, blue lips, skin or fingernails  · Anxiety, restlessness, fever, sweating, muscle spasms, twitching, seeing or hearing things that are not there  · Extreme weakness, shallow breathing, slow heartbeat  · Severe confusion, lightheadedness, dizziness, fainting  · Sweating or cold, clammy skin, seizures  · Severe constipation, stomach pain, nausea, vomiting     Common side effects:  · Mild constipation  · Sleepiness, tiredness  © 2017 [facility] Information is for End User's use only and may not be sold, redistributed or otherwise used for commercial purposes. Please carry medication information at all times in case of emergency situations. These are general instructions for a healthy lifestyle:    No smoking/ No tobacco products/ Avoid exposure to second hand smoke  Surgeon General's Warning:  Quitting smoking now greatly reduces serious risk to your health. Obesity, smoking, and sedentary lifestyle greatly increases your risk for illness    A healthy diet, regular physical exercise & weight monitoring are important for maintaining a healthy lifestyle    You may be retaining fluid if you have a history of heart failure or if you experience any of the following symptoms:  Weight gain of 3 pounds or more overnight or 5 pounds in a week, increased swelling in our hands or feet or shortness of breath while lying flat in bed. Please call your doctor as soon as you notice any of these symptoms; do not wait until your next office visit. Recognize signs and symptoms of STROKE:    F-face looks uneven    A-arms unable to move or move unevenly    S-speech slurred or non-existent    T-time-call 911 as soon as signs and symptoms begin-DO NOT go       Back to bed or wait to see if you get better-TIME IS BRAIN. Warning Signs of HEART ATTACK     Call 911 if you have these symptoms:   Chest discomfort. Most heart attacks involve discomfort in the center of the chest that lasts more than a few minutes, or that goes away and comes back. It can feel like uncomfortable pressure, squeezing, fullness, or pain.  Discomfort in other areas of the upper body. Symptoms can include pain or discomfort in one or both arms, the back, neck, jaw, or stomach.  Shortness of breath with or without chest discomfort.  Other signs may include breaking out in a cold sweat, nausea, or lightheadedness. Don't wait more than five minutes to call 911 - MINUTES MATTER! Fast action can save your life.  Calling 911 is almost always the fastest way to get lifesaving treatment. Emergency Medical Services staff can begin treatment when they arrive -- up to an hour sooner than if someone gets to the hospital by car. The discharge information has been reviewed with the patient and caregiver. The patient and caregiver verbalized understanding. Discharge medications reviewed with the patient and caregiver and appropriate educational materials and side effects teaching were provided.

## 2018-09-28 NOTE — BRIEF OP NOTE
BRIEF OPERATIVE NOTE Date of Procedure: 9/28/2018 Preoperative Diagnosis: PROLAPSING HEMORRHOIDS Postoperative Diagnosis: * No post-op diagnosis entered * Procedure(s): HEMORRHOIDECTOMY Surgeon(s) and Role: 
   * Washington David MD - Primary Surgical Assistant: Temo Landaverde Surgical Staff: 
Circ-1: Deniz Flores; Karthikeyan Martinez RN Scrub Tech-1: Cezar Billings Scrub Tech-Relief: Temo Landaverde Surg Asst-Relief: Temo Landaverde Event Time In Incision Start 21  Incision Close Anesthesia: MAC Estimated Blood Loss: 1cc Specimens: * No specimens in log * Findings: left lateral thrombosed internal/external hemorrhoid, left lateral distal rectal polyp Complications: none apparent Implants: * No implants in log *

## 2018-12-07 ENCOUNTER — ANESTHESIA (OUTPATIENT)
Dept: ENDOSCOPY | Age: 68
End: 2018-12-07
Payer: COMMERCIAL

## 2018-12-07 ENCOUNTER — ANESTHESIA EVENT (OUTPATIENT)
Dept: ENDOSCOPY | Age: 68
End: 2018-12-07
Payer: COMMERCIAL

## 2018-12-07 ENCOUNTER — HOSPITAL ENCOUNTER (OUTPATIENT)
Age: 68
Setting detail: OUTPATIENT SURGERY
Discharge: HOME | End: 2018-12-07
Attending: COLON & RECTAL SURGERY | Admitting: COLON & RECTAL SURGERY
Payer: COMMERCIAL

## 2018-12-07 VITALS
HEART RATE: 61 BPM | RESPIRATION RATE: 18 BRPM | BODY MASS INDEX: 23.62 KG/M2 | DIASTOLIC BLOOD PRESSURE: 72 MMHG | HEIGHT: 70 IN | WEIGHT: 165 LBS | OXYGEN SATURATION: 98 % | TEMPERATURE: 97.6 F | SYSTOLIC BLOOD PRESSURE: 115 MMHG

## 2018-12-07 PROCEDURE — 77030027957 HC TBNG IRR ENDOGTR BUSS -B: Performed by: COLON & RECTAL SURGERY

## 2018-12-07 PROCEDURE — 88305 TISSUE EXAM BY PATHOLOGIST: CPT

## 2018-12-07 PROCEDURE — 77030013992 HC SNR POLYP ENDOSC BSC -B: Performed by: COLON & RECTAL SURGERY

## 2018-12-07 PROCEDURE — 76040000019: Performed by: COLON & RECTAL SURGERY

## 2018-12-07 PROCEDURE — 74011250636 HC RX REV CODE- 250/636

## 2018-12-07 PROCEDURE — 76060000031 HC ANESTHESIA FIRST 0.5 HR: Performed by: COLON & RECTAL SURGERY

## 2018-12-07 PROCEDURE — 74011250636 HC RX REV CODE- 250/636: Performed by: COLON & RECTAL SURGERY

## 2018-12-07 RX ORDER — SODIUM CHLORIDE 0.9 % (FLUSH) 0.9 %
5-10 SYRINGE (ML) INJECTION EVERY 8 HOURS
Status: DISCONTINUED | OUTPATIENT
Start: 2018-12-07 | End: 2018-12-07 | Stop reason: HOSPADM

## 2018-12-07 RX ORDER — LIDOCAINE HYDROCHLORIDE 20 MG/ML
INJECTION, SOLUTION EPIDURAL; INFILTRATION; INTRACAUDAL; PERINEURAL AS NEEDED
Status: DISCONTINUED | OUTPATIENT
Start: 2018-12-07 | End: 2018-12-07 | Stop reason: HOSPADM

## 2018-12-07 RX ORDER — PROPOFOL 10 MG/ML
INJECTION, EMULSION INTRAVENOUS AS NEEDED
Status: DISCONTINUED | OUTPATIENT
Start: 2018-12-07 | End: 2018-12-07 | Stop reason: HOSPADM

## 2018-12-07 RX ORDER — DEXTROMETHORPHAN/PSEUDOEPHED 2.5-7.5/.8
1.2 DROPS ORAL
Status: DISCONTINUED | OUTPATIENT
Start: 2018-12-07 | End: 2018-12-07 | Stop reason: HOSPADM

## 2018-12-07 RX ORDER — FLUMAZENIL 0.1 MG/ML
0.2 INJECTION INTRAVENOUS
Status: DISCONTINUED | OUTPATIENT
Start: 2018-12-07 | End: 2018-12-07 | Stop reason: HOSPADM

## 2018-12-07 RX ORDER — SODIUM CHLORIDE 9 MG/ML
INJECTION, SOLUTION INTRAVENOUS
Status: DISCONTINUED | OUTPATIENT
Start: 2018-12-07 | End: 2018-12-07 | Stop reason: HOSPADM

## 2018-12-07 RX ORDER — SODIUM CHLORIDE 9 MG/ML
50 INJECTION, SOLUTION INTRAVENOUS CONTINUOUS
Status: DISCONTINUED | OUTPATIENT
Start: 2018-12-07 | End: 2018-12-07 | Stop reason: HOSPADM

## 2018-12-07 RX ORDER — SODIUM CHLORIDE 0.9 % (FLUSH) 0.9 %
5-10 SYRINGE (ML) INJECTION AS NEEDED
Status: DISCONTINUED | OUTPATIENT
Start: 2018-12-07 | End: 2018-12-07 | Stop reason: HOSPADM

## 2018-12-07 RX ORDER — NALOXONE HYDROCHLORIDE 0.4 MG/ML
0.4 INJECTION, SOLUTION INTRAMUSCULAR; INTRAVENOUS; SUBCUTANEOUS
Status: DISCONTINUED | OUTPATIENT
Start: 2018-12-07 | End: 2018-12-07 | Stop reason: HOSPADM

## 2018-12-07 RX ORDER — EPINEPHRINE 0.1 MG/ML
1 INJECTION INTRACARDIAC; INTRAVENOUS
Status: DISCONTINUED | OUTPATIENT
Start: 2018-12-07 | End: 2018-12-07 | Stop reason: HOSPADM

## 2018-12-07 RX ORDER — ATROPINE SULFATE 0.1 MG/ML
0.5 INJECTION INTRAVENOUS
Status: DISCONTINUED | OUTPATIENT
Start: 2018-12-07 | End: 2018-12-07 | Stop reason: HOSPADM

## 2018-12-07 RX ADMIN — PROPOFOL 40 MG: 10 INJECTION, EMULSION INTRAVENOUS at 08:35

## 2018-12-07 RX ADMIN — SODIUM CHLORIDE: 9 INJECTION, SOLUTION INTRAVENOUS at 08:16

## 2018-12-07 RX ADMIN — PROPOFOL 50 MG: 10 INJECTION, EMULSION INTRAVENOUS at 08:22

## 2018-12-07 RX ADMIN — PROPOFOL 30 MG: 10 INJECTION, EMULSION INTRAVENOUS at 08:18

## 2018-12-07 RX ADMIN — PROPOFOL 40 MG: 10 INJECTION, EMULSION INTRAVENOUS at 08:36

## 2018-12-07 RX ADMIN — PROPOFOL 50 MG: 10 INJECTION, EMULSION INTRAVENOUS at 08:26

## 2018-12-07 RX ADMIN — LIDOCAINE HYDROCHLORIDE 40 MG: 20 INJECTION, SOLUTION EPIDURAL; INFILTRATION; INTRACAUDAL; PERINEURAL at 08:16

## 2018-12-07 RX ADMIN — PROPOFOL 50 MG: 10 INJECTION, EMULSION INTRAVENOUS at 08:16

## 2018-12-07 NOTE — OP NOTES
Colonoscopy Procedure Note    Indications: Previous adenomatous polyp    Anesthesia/Sedation: MAC anesthesia Propofol    Pre-Procedure Exam:  Airway: clear   Heart: normal S1and S2    Lungs: clear bilateral  Abdomen: soft, nontender, bowel sounds present and normal in all quadrants   Mental Status: awake, alert, and oriented to person, place, and time      Procedure in Detail:  Informed consent was obtained for the procedure, including sedation. Risks of perforation, hemorrhage, adverse drug reaction, and aspiration were discussed. The patient was placed in the left lateral decubitus position. Based on the pre-procedure assessment, including review of the patient's medical history, medications, allergies, and review of systems, he had been deemed to be an appropriate candidate for moderate sedation; he was therefore sedated with the medications listed above. The patient was monitored continuously with ECG tracing, pulse oximetry, blood pressure monitoring, and direct observations. A rectal examination was performed. The [id number] was inserted into the rectum and advanced under direct vision to the cecum, which was identified by the ileocecal valve and appendiceal orifice. The quality of the colonic preparation was excellent. A careful inspection was made as the colonoscope was withdrawn, including a retroflexed view of the rectum; findings and interventions are described below. Appropriate photodocumentation was obtained.     Findings:   Rectum:   Normal  Sigmoid:     - Excavated lesions:     - Diverticulosis  Descending Colon:     - Excavated lesions:     - Diverticulosis  Transverse Colon:     - Excavated lesions:     - Diverticulosis    - Protruding lesions:     -Pedunculated Polyp(s) size 5 mm removed by polypectomy (hot biopsy)  Ascending Colon:     - Protruding lesions:     -Pedunculated Polyp(s) size 7 mm removed by polypectomy (snare cautery)  Cecum:   Normal          Specimens: Specimens were collected and sent to pathology. EBL: None    Complications: None; patient tolerated the procedure well. Attending Attestation: I performed the procedure.     Recommendations:    - repeat colonoscopy in 1 year    Signed By: Michael Renee MD                        December 7, 2018

## 2018-12-07 NOTE — BRIEF OP NOTE
BRIEF OPERATIVE NOTE Date of Procedure: 12/7/2018 Preoperative Diagnosis: HISTORY OF COLON POLYPS Postoperative Diagnosis: diverticulosis, colon polyps Procedure(s): 
COLONOSCOPY 
ENDOSCOPIC POLYPECTOMY Surgeon(s) and Role: Elder Etienne MD - Primary Surgical Assistant:  
 
Surgical Staff: 
Endoscopy Technician-1: Patrick Davila Endoscopy RN-1: Michell Ellsworth RN No case tracking events are documented in the log. Anesthesia: MAC Estimated Blood Loss: none Specimens:  
ID Type Source Tests Collected by Time Destination 1 : pathology Preservative Colon, Ascending  Elder Etienne MD 12/7/2018 3883 Pathology 2 : pathology Preservative Colon, Transverse  Elder Etienne MD 12/7/2018 9404 Pathology Findings: 2 polyps, pandiverticulosis Complications: none apparent Implants: * No implants in log *

## 2018-12-07 NOTE — PROGRESS NOTES
Endoscope was pre-cleaned at bedside immediately following procedure by Mariela Mccord. Priscilla Napier

## 2018-12-07 NOTE — ANESTHESIA POSTPROCEDURE EVALUATION
Procedure(s): 
COLONOSCOPY 
ENDOSCOPIC POLYPECTOMY. 
 
<BSHSIANPOST> Visit Vitals /72 Pulse 61 Temp 36.4 °C (97.6 °F) Resp 18 Ht 5' 10\" (1.778 m) Wt 74.8 kg (165 lb) SpO2 98% BMI 23.68 kg/m²

## 2018-12-07 NOTE — H&P
Colon and Rectal Surgery History and Physical 
 
Subjective:  
  
Jacobo Moreland is a 77 y.o. male who has hx dwain Patient Active Problem List  
 Diagnosis Date Noted  Hemorrhoids 09/28/2018 Past Medical History:  
Diagnosis Date  Arthritis  Cancer [facility])   
 prostate  Ill-defined condition   
 bilateral cataracts  Nausea & vomiting Past Surgical History:  
Procedure Laterality Date  HX ORTHOPAEDIC Bilateral   
 multiple knee surgeries, mostly related to fibrosis  HX PROSTATECTOMY  2005  
 prostatectomy Social History Tobacco Use  Smoking status: Never Smoker  Smokeless tobacco: Never Used Substance Use Topics  Alcohol use: Yes Alcohol/week: 3.6 oz Types: 6 Glasses of wine per week Family History Problem Relation Age of Onset  Lung Disease Mother   
     lung failure, age 76  Cancer Father   
     throat, smoker, age 64 Prior to Admission medications Medication Sig Start Date End Date Taking? Authorizing Provider MULTIVITAMIN PO Take 1 Tab by mouth daily. Yes Provider, Historical  
cyanocobalamin, vitamin B-12, (VITAMIN B-12 PO) Take 1 Tab by mouth daily. Yes Provider, Historical  
vitamin E acetate (VITAMIN E PO) Take 1 Tab by mouth daily. Yes Provider, Historical  
 
No Known Allergies Review of Systems: A comprehensive review of systems was negative except for that written in the History of Present Illness. Objective:  
 
Visit Vitals /76 Pulse 61 Temp 97.6 °F (36.4 °C) Resp 16 Ht 5' 10\" (1.778 m) Wt 74.8 kg (165 lb) SpO2 96% BMI 23.68 kg/m² Physical Exam:  
nad Chest clear Heart reg 
abd soft Imaging:  images and reports reviewed Lab Review:  No results found for this or any previous visit (from the past 24 hour(s)). Labs and radiology: images and reports reviewed Assessment: Hx dwain Plan: 1. I recommend proceeding with colonoscopy. Treatment alternatives were discussed. 2. Discussed aspects of surgical intervention, methods, risks (including by not limited to infection, bleeding, hematoma, and perforation of the intestines or solid organs), and the risks of general anesthetic. The patient understands the risks; any and all questions were answered to the patient's satisfaction. Signed By: Sarath Mcfadden MD   
 December 7, 2018

## 2018-12-07 NOTE — ANESTHESIA PREPROCEDURE EVALUATION
Anesthesia Evaluation Physical Exam 
 
Airway Mallampati: II 
TM Distance: > 6 cm Neck ROM: normal range of motion Mouth opening: Normal 
 
 Cardiovascular Regular rate and rhythm,  S1 and S2 normal,  no murmur, click, rub, or gallop Dental 
No notable dental hx Pulmonary Breath sounds clear to auscultation Abdominal 
GI exam deferred Other Findings Anesthetic Plan ASA: 2 Anesthesia type: MAC Induction: Intravenous Anesthetic plan and risks discussed with: Patient

## 2018-12-07 NOTE — ROUTINE PROCESS
Christy Griggs 1950 
[id number] Situation: 
Verbal report received from: Angelica Arora RN Procedure: Procedure(s): 
COLONOSCOPY 
ENDOSCOPIC POLYPECTOMY Background: 
 
Preoperative diagnosis: HISTORY OF COLON POLYPS Postoperative diagnosis: diverticulosis, colon polyps :  Dr. Ashlee Chávez Assistant(s): Endoscopy Technician-1: Fernando Cain Endoscopy RN-1: Shaheed Morfin RN Specimens:  
ID Type Source Tests Collected by Time Destination 1 : pathology Preservative Colon, Ascending  Tatianna Pierre MD 12/7/2018 6893 Pathology 2 : pathology Preservative Colon, Transverse  Tatianna Pierre MD 12/7/2018 3069 Pathology H. Pylori  no Assessment: 
Intra-procedure medications Anesthesia gave intra-procedure sedation and medications, see anesthesia flow sheet yes Intravenous fluids: NS@ Lorry Boozer Vital signs stable Abdominal assessment: round and soft Recommendation: 
Discharge patient per MD order. Family or Friend Permission to share finding with family or friend yes

## 2018-12-07 NOTE — DISCHARGE INSTRUCTIONS
Alma Rosa Ribera  [id number]  1950    COLON DISCHARGE INSTRUCTIONS  Discomfort:  Redness at IV site- apply warm compress to area; if redness or soreness persist- contact your physician  There may be a slight amount of blood passed from the rectum  Gaseous discomfort- walking, belching will help relieve any discomfort  You may not operate a vehicle for 12 hours  You may not engage in an occupation involving machinery or appliances for rest of today  You may not drink alcoholic beverages for at least 12 hours  Avoid making any critical decisions for at least 24 hour  DIET:   High fiber diet. - however -  remember your colon is empty and a heavy meal will produce gas. Avoid these foods:  vegetables, fried / greasy foods, carbonated drinks for today    MEDICATIONS:  Avoid blood thinners for one week       ACTIVITY:  You may resume your normal daily activities it is recommended that you spend the remainder of the day resting -  avoid any strenuous activity. CALL M.D. ANY SIGN OF:   Increasing pain, nausea, vomiting  Abdominal distension (swelling)  New increased bleeding (oral or rectal)  Fever (chills)  Pain in chest area  Bloody discharge from nose or mouth  Shortness of breath     Follow-up Instructions:   Call Sheela Godinez MD if any questions or problems. Telephone # [phone]  Biopsy results will be available in  7 to10 days  Should have a repeat colonoscopy in 1 year. COLONOSCOPY FINDINGS:  Your colonoscopy showed: 2 polyps, pandiverticulosis. Diverticulosis: Care Instructions  Your Care Instructions  In diverticulosis, pouches called diverticula form in the wall of the large intestine (colon). The pouches do not cause any pain or other symptoms. Most people who have diverticulosis do not know they have it. But the pouches sometimes bleed, and if they become infected, they can cause pain and other symptoms. When this happens, it is called diverticulitis.   Diverticula form when pressure pushes the wall of the colon outward at certain weak points. A diet that is too low in fiber can cause diverticula. Follow-up care is a key part of your treatment and safety. Be sure to make and go to all appointments, and call your doctor if you are having problems. It's also a good idea to know your test results and keep a list of the medicines you take. How can you care for yourself at home? · Include fruits, leafy green vegetables, beans, and whole grains in your diet each day. These foods are high in fiber. · Take a fiber supplement, such as Citrucel or Metamucil, every day if needed. Read and follow all instructions on the label. · Drink plenty of fluids, enough so that your urine is light yellow or clear like water. If you have kidney, heart, or liver disease and have to limit fluids, talk with your doctor before you increase the amount of fluids you drink. · Get at least 30 minutes of exercise on most days of the week. Walking is a good choice. You also may want to do other activities, such as running, swimming, cycling, or playing tennis or team sports. · Cut out foods that cause gas, pain, or other symptoms. When should you call for help? Call your doctor now or seek immediate medical care if:    · You have belly pain.     · You pass maroon or very bloody stools.     · You have a fever.     · You have nausea and vomiting.     · You have unusual changes in your bowel movements or abdominal swelling.     · You have burning pain when you urinate.     · You have abnormal vaginal discharge.     · You have shoulder pain.     · You have cramping pain that does not get better when you have a bowel movement or pass gas.     · You pass gas or stool from your urethra while urinating.    Watch closely for changes in your health, and be sure to contact your doctor if you have any problems. Where can you learn more? Go to http://selena-aleyda.info/.   Enter T742 in the search box to learn more about \"Diverticulosis: Care Instructions. \"  Current as of: March 28, 2018  Content Version: 11.8  © 0231-8113 [employer]. Care instructions adapted under license by [employer] (which disclaims liability or warranty for this information). If you have questions about a medical condition or this instruction, always ask your healthcare professional. [address] any warranty or liability for your use of this information. Colon Polyps: Care Instructions  Your Care Instructions    Colon polyps are growths in the colon or the rectum. The cause of most colon polyps is not known, and most people who get them do not have any problems. But a certain kind can turn into cancer. For this reason, regular testing for colon polyps is important for people age 48 and older and anyone who has an increased risk for colon cancer. Polyps are usually found through routine colon cancer screening tests. Although most colon polyps are not cancerous, they are usually removed and then tested for cancer. Screening for colon cancer saves lives because the cancer can usually be cured if it is caught early. If you have a polyp that is the type that can turn into cancer, you may need more tests to examine your entire colon. The doctor will remove any other polyps that he or she finds, and you will be tested more often. Follow-up care is a key part of your treatment and safety. Be sure to make and go to all appointments, and call your doctor if you are having problems. It's also a good idea to know your test results and keep a list of the medicines you take. How can you care for yourself at home? Regular exams to look for colon polyps are the best way to prevent polyps from turning into colon cancer. These can include stool tests, sigmoidoscopy, colonoscopy, and CT colonography. Talk with your doctor about a testing schedule that is right for you.   To prevent polyps  There is no home treatment that can prevent colon polyps. But these steps may help lower your risk for cancer. · Stay active. Being active can help you get to and stay at a healthy weight. Try to exercise on most days of the week. Walking is a good choice. · Eat well. Choose a variety of vegetables, fruits, legumes (such as peas and beans), fish, poultry, and whole grains. · Do not smoke. If you need help quitting, talk to your doctor about stop-smoking programs and medicines. These can increase your chances of quitting for good. · If you drink alcohol, limit how much you drink. Limit alcohol to 2 drinks a day for men and 1 drink a day for women. When should you call for help? Call your doctor now or seek immediate medical care if:    · You have severe belly pain.     · Your stools are maroon or very bloody.    Watch closely for changes in your health, and be sure to contact your doctor if:    · You have a fever.     · You have nausea or vomiting.     · You have a change in bowel habits (new constipation or diarrhea).     · Your symptoms get worse or are not improving as expected. Where can you learn more? Go to http://selena-aleyda.info/. Enter 95 [id number] in the search box to learn more about \"Colon Polyps: Care Instructions. \"  Current as of: March 28, 2018  Content Version: 11.8  © 8735-4726 [employer]. Care instructions adapted under license by [employer] (which disclaims liability or warranty for this information). If you have questions about a medical condition or this instruction, always ask your healthcare professional. [place] [address] any warranty or liability for your use of this information.

## 2020-06-01 RX ORDER — B-COMPLEX WITH VITAMIN C
1 TABLET ORAL DAILY
COMMUNITY

## 2020-06-02 ENCOUNTER — OFFICE VISIT (OUTPATIENT)
Dept: URGENT CARE | Age: 70
End: 2020-06-02

## 2020-06-02 VITALS — RESPIRATION RATE: 16 BRPM | OXYGEN SATURATION: 96 % | HEART RATE: 72 BPM | TEMPERATURE: 98.5 F

## 2020-06-02 DIAGNOSIS — Z20.822 ENCOUNTER FOR LABORATORY TESTING FOR COVID-19 VIRUS: Primary | ICD-10-CM

## 2020-06-04 ENCOUNTER — ANESTHESIA EVENT (OUTPATIENT)
Dept: ENDOSCOPY | Age: 70
End: 2020-06-04
Payer: COMMERCIAL

## 2020-06-04 LAB — SARS-COV-2, NAA: NOT DETECTED

## 2020-06-04 NOTE — ANESTHESIA PREPROCEDURE EVALUATION
Anesthetic History     PONV          Review of Systems / Medical History  Patient summary reviewed, nursing notes reviewed and pertinent labs reviewed    Pulmonary  Within defined limits                 Neuro/Psych   Within defined limits           Cardiovascular  Within defined limits                Exercise tolerance: >4 METS     GI/Hepatic/Renal               Comments: H/O Colon Polyps    H/O Hemorrhoids s/p excision (9/28/18) Endo/Other        Arthritis    Comments: H/O Prostate Cancer s/p Prostatectomy (2005) Other Findings              Physical Exam    Airway  Mallampati: II  TM Distance: 4 - 6 cm  Neck ROM: normal range of motion   Mouth opening: Normal     Cardiovascular  Regular rate and rhythm,  S1 and S2 normal,  no murmur, click, rub, or gallop             Dental    Dentition: Implants     Pulmonary  Breath sounds clear to auscultation               Abdominal  GI exam deferred       Other Findings            Anesthetic Plan    ASA: 2  Anesthesia type: MAC and total IV anesthesia          Induction: Intravenous  Anesthetic plan and risks discussed with: Patient

## 2020-06-05 ENCOUNTER — ANESTHESIA (OUTPATIENT)
Dept: ENDOSCOPY | Age: 70
End: 2020-06-05
Payer: COMMERCIAL

## 2020-06-05 ENCOUNTER — HOSPITAL ENCOUNTER (OUTPATIENT)
Age: 70
Setting detail: OUTPATIENT SURGERY
Discharge: HOME | End: 2020-06-05
Attending: COLON & RECTAL SURGERY | Admitting: COLON & RECTAL SURGERY
Payer: COMMERCIAL

## 2020-06-05 VITALS
TEMPERATURE: 98.1 F | SYSTOLIC BLOOD PRESSURE: 111 MMHG | HEIGHT: 70 IN | WEIGHT: 165 LBS | DIASTOLIC BLOOD PRESSURE: 83 MMHG | OXYGEN SATURATION: 97 % | RESPIRATION RATE: 7 BRPM | BODY MASS INDEX: 23.62 KG/M2 | HEART RATE: 65 BPM

## 2020-06-05 PROCEDURE — 76040000007: Performed by: COLON & RECTAL SURGERY

## 2020-06-05 PROCEDURE — 74011250636 HC RX REV CODE- 250/636: Performed by: NURSE ANESTHETIST, CERTIFIED REGISTERED

## 2020-06-05 PROCEDURE — 76060000032 HC ANESTHESIA 0.5 TO 1 HR: Performed by: COLON & RECTAL SURGERY

## 2020-06-05 RX ORDER — SODIUM CHLORIDE 0.9 % (FLUSH) 0.9 %
5-40 SYRINGE (ML) INJECTION AS NEEDED
Status: DISCONTINUED | OUTPATIENT
Start: 2020-06-05 | End: 2020-06-05 | Stop reason: HOSPADM

## 2020-06-05 RX ORDER — ATROPINE SULFATE 0.1 MG/ML
0.5 INJECTION INTRAVENOUS
Status: DISCONTINUED | OUTPATIENT
Start: 2020-06-05 | End: 2020-06-05 | Stop reason: HOSPADM

## 2020-06-05 RX ORDER — FLUMAZENIL 0.1 MG/ML
0.2 INJECTION INTRAVENOUS
Status: DISCONTINUED | OUTPATIENT
Start: 2020-06-05 | End: 2020-06-05 | Stop reason: HOSPADM

## 2020-06-05 RX ORDER — SODIUM CHLORIDE 9 MG/ML
INJECTION, SOLUTION INTRAVENOUS
Status: DISCONTINUED | OUTPATIENT
Start: 2020-06-05 | End: 2020-06-05 | Stop reason: HOSPADM

## 2020-06-05 RX ORDER — NALOXONE HYDROCHLORIDE 0.4 MG/ML
0.4 INJECTION, SOLUTION INTRAMUSCULAR; INTRAVENOUS; SUBCUTANEOUS
Status: DISCONTINUED | OUTPATIENT
Start: 2020-06-05 | End: 2020-06-05 | Stop reason: HOSPADM

## 2020-06-05 RX ORDER — EPINEPHRINE 0.1 MG/ML
1 INJECTION INTRACARDIAC; INTRAVENOUS
Status: DISCONTINUED | OUTPATIENT
Start: 2020-06-05 | End: 2020-06-05 | Stop reason: HOSPADM

## 2020-06-05 RX ORDER — SODIUM CHLORIDE 0.9 % (FLUSH) 0.9 %
5-40 SYRINGE (ML) INJECTION EVERY 8 HOURS
Status: DISCONTINUED | OUTPATIENT
Start: 2020-06-05 | End: 2020-06-05 | Stop reason: HOSPADM

## 2020-06-05 RX ORDER — DEXTROMETHORPHAN/PSEUDOEPHED 2.5-7.5/.8
1.2 DROPS ORAL
Status: DISCONTINUED | OUTPATIENT
Start: 2020-06-05 | End: 2020-06-05 | Stop reason: HOSPADM

## 2020-06-05 RX ORDER — SODIUM CHLORIDE 9 MG/ML
50 INJECTION, SOLUTION INTRAVENOUS CONTINUOUS
Status: DISCONTINUED | OUTPATIENT
Start: 2020-06-05 | End: 2020-06-05 | Stop reason: HOSPADM

## 2020-06-05 RX ORDER — PROPOFOL 10 MG/ML
INJECTION, EMULSION INTRAVENOUS AS NEEDED
Status: DISCONTINUED | OUTPATIENT
Start: 2020-06-05 | End: 2020-06-05 | Stop reason: HOSPADM

## 2020-06-05 RX ADMIN — PROPOFOL 25 MG: 10 INJECTION, EMULSION INTRAVENOUS at 08:39

## 2020-06-05 RX ADMIN — PROPOFOL 25 MG: 10 INJECTION, EMULSION INTRAVENOUS at 08:35

## 2020-06-05 RX ADMIN — PROPOFOL 25 MG: 10 INJECTION, EMULSION INTRAVENOUS at 08:31

## 2020-06-05 RX ADMIN — PROPOFOL 25 MG: 10 INJECTION, EMULSION INTRAVENOUS at 08:43

## 2020-06-05 RX ADMIN — SODIUM CHLORIDE: 900 INJECTION, SOLUTION INTRAVENOUS at 07:33

## 2020-06-05 RX ADMIN — PROPOFOL 25 MG: 10 INJECTION, EMULSION INTRAVENOUS at 08:33

## 2020-06-05 RX ADMIN — PROPOFOL 25 MG: 10 INJECTION, EMULSION INTRAVENOUS at 08:41

## 2020-06-05 RX ADMIN — PROPOFOL 40 MG: 10 INJECTION, EMULSION INTRAVENOUS at 08:29

## 2020-06-05 RX ADMIN — PROPOFOL 60 MG: 10 INJECTION, EMULSION INTRAVENOUS at 08:26

## 2020-06-05 RX ADMIN — PROPOFOL 25 MG: 10 INJECTION, EMULSION INTRAVENOUS at 08:37

## 2020-06-05 NOTE — PERIOP NOTES
Lucy Irvin  1950  [id number]    Situation:  Verbal report received from: Alonso Das  Procedure: Procedure(s):  COLONOSCOPY :-    Background:    Preoperative diagnosis: HISTORY OF POLYPS  Postoperative diagnosis: Diverticulosis    :  Dr. Melissa Lennon  Assistant(s): Endoscopy Technician-1: Evita Laguna  Endoscopy RN-1: Hank Nobles RN    Specimens: * No specimens in log *  H. Pylori  no    Assessment:  Intra-procedure medications   Anesthesia gave intra-procedure sedation and medications, see anesthesia flow sheet yes    Intravenous fluids: NS@ KVO     Vital signs stable     Abdominal assessment: round and soft     Recommendation:  Discharge patient per MD order.   Family or Friend   Permission to share finding with family or friend yes

## 2020-06-05 NOTE — OP NOTES
Colonoscopy Procedure Note    Indications: Previous adenomatous polyp    Anesthesia/Sedation: MAC anesthesia Propofol    Pre-Procedure Exam:  Airway: clear   Heart: normal S1and S2    Lungs: clear bilateral  Abdomen: soft, nontender, bowel sounds present and normal in all quadrants   Mental Status: awake, alert, and oriented to person, place, and time      Procedure in Detail:  Informed consent was obtained for the procedure, including sedation. Risks of perforation, hemorrhage, adverse drug reaction, and aspiration were discussed. The patient was placed in the left lateral decubitus position. Based on the pre-procedure assessment, including review of the patient's medical history, medications, allergies, and review of systems, he had been deemed to be an appropriate candidate for moderate sedation; he was therefore sedated with the medications listed above. The patient was monitored continuously with ECG tracing, pulse oximetry, blood pressure monitoring, and direct observations. A rectal examination was performed. The [id number] was inserted into the rectum and advanced under direct vision to the cecum, which was identified by the ileocecal valve and appendiceal orifice. The quality of the colonic preparation was excellent. A careful inspection was made as the colonoscope was withdrawn, including a retroflexed view of the rectum; findings and interventions are described below. Appropriate photodocumentation was obtained. Findings:   Rectum:   Normal  Sigmoid:     - Excavated lesions:     - Diverticulosis  Descending Colon:     - Excavated lesions:     - Diverticulosis  Transverse Colon:     - Excavated lesions:     - Diverticulosis  Ascending Colon:   Normal  Cecum:   Normal          Specimens: No specimens were collected. EBL: None    Complications: None; patient tolerated the procedure well. Attending Attestation: I performed the procedure.     Recommendations:   - Repeat colonoscopy in 5 years.

## 2020-06-05 NOTE — H&P
Colon and Rectal Surgery History and Physical    Subjective:      Brodie Gilbert is a 68 y.o. 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 Authorizing Provider   flaxseed oil (OMEGA 3 PO) Take  by mouth. 565 mg omega 3 per pill. With Borage oil 70 mg. Takes two po once daily. Yes Provider, Historical   b-complex with vitamin c tablet Take 1 Tab by mouth daily. Takes one po once daily. Yes Provider, Historical   TURMERIC PO Take  by mouth. Takes one po once daily. Yes Provider, Historical   MULTIVITAMIN PO Take 1 Tab by mouth daily. Yes Provider, Historical     No Known Allergies     Review of Systems:    A comprehensive review of systems was negative except for that written in the History of Present Illness.     Objective:     Visit Vitals  /66   Pulse 70   Temp 97.8 °F (36.6 °C)   Resp 20   Ht 5' 10\" (1.778 m)   Wt 74.8 kg (165 lb)   SpO2 98%   BMI 23.68 kg/m²        Physical Exam:   General:  Alert, cooperative, no distress, appears stated age. Head:  Normocephalic, without obvious abnormality, atraumatic. Eyes:  Conjunctivae/corneas clear. PERRL, EOMs intact. Ears:  Normal external ear canals both ears. Nose: Nares normal. Septum midline. No drainage. Throat: Lips, mucosa, and tongue normal. Teeth and gums normal.   Neck: Supple, symmetrical, trachea midline, no adenopathy   Back:   Symmetric, no curvature. ROM normal.   Lungs:   Clear   Chest wall:  No tenderness or deformity. Heart:  Regular rate and rhythm       Abdomen:   Soft, non-tender. Bowel sounds normal. No masses,  No organomegaly. Genitalia:  deferred       Extremities: Extremities normal, atraumatic, no cyanosis or edema. Skin: Skin color, texture, turgor normal. No rashes or lesions. Neurologic: CNII-XII intact. Normal strength, sensation and reflexes throughout. Imaging:  images and reports reviewed    Lab Review:  No results found for this or any previous visit (from the past 24 hour(s)). Labs and radiology: images and reports reviewed      Assessment:     Hx dwain    Plan:     1. I recommend proceeding with colonoscopy. Treatment alternatives were discussed. 2. Discussed aspects of surgical intervention, methods, risks (including by not limited to infection, bleeding, hematoma, and perforation of the intestines or solid organs), and the risks of general anesthetic. The patient understands the risks; any and all questions were answered to the patient's satisfaction.     Signed By: Victor Manuel Ivan MD     June 5, 2020

## 2020-06-05 NOTE — BRIEF OP NOTE
Brief Postoperative Note    Patient: Elaine Nunez  YOB: 1950  MRN: [id number]    Date of Procedure: 6/5/2020     Pre-Op Diagnosis: HISTORY OF POLYPS    Post-Op Diagnosis: Same as preoperative diagnosis.       Procedure(s):  COLONOSCOPY :-    Surgeon(s):  Emily Frye MD    Surgical Assistant: None    Anesthesia: MAC     Estimated Blood Loss (mL): Minimal    Complications: None    Specimens: * No specimens in log *     Implants: * No implants in log *    Drains: * No LDAs found *    Findings: pandiverticulosis    Electronically Signed by Donn Gibbons MD on 6/5/2020 at 8:46 AM

## 2020-06-05 NOTE — ANESTHESIA POSTPROCEDURE EVALUATION
Procedure(s):  COLONOSCOPY :-.    MAC, total IV anesthesia    Anesthesia Post Evaluation        Patient location during evaluation: PACU  Note status: Adequate. Level of consciousness: responsive to verbal stimuli and sleepy but conscious  Pain management: satisfactory to patient  Airway patency: patent  Anesthetic complications: no  Cardiovascular status: acceptable  Respiratory status: acceptable  Hydration status: acceptable  Comments: +Post-Anesthesia Evaluation and Assessment    Patient: Vickey Blevins MRN: [id number]  SSN: [id number]   YOB: 1950  Age: 68 y.o. Sex: male      Cardiovascular Function/Vital Signs    BP 93/59   Pulse 62   Temp 36.6 °C (97.8 °F)   Resp 12   Ht 5' 10\" (1.778 m)   Wt 74.8 kg (165 lb)   SpO2 96%   BMI 23.68 kg/m²     Patient is status post Procedure(s):  COLONOSCOPY :-.    Nausea/Vomiting: Controlled. Postoperative hydration reviewed and adequate. Pain:  Pain Scale 1: Numeric (0 - 10) (06/05/20 0718)  Pain Intensity 1: 0 (06/05/20 0718)   Managed. Neurological Status: At baseline. Mental Status and Level of Consciousness: Arousable. Pulmonary Status:   O2 Device: CO2 nasal cannula (06/05/20 0845)   Adequate oxygenation and airway patent. Complications related to anesthesia: None    Post-anesthesia assessment completed. No concerns. Signed By: Darinel Tate MD    6/5/2020  Post anesthesia nausea and vomiting:  controlled      INITIAL Post-op Vital signs: No vitals data found for the desired time range.

## 2020-06-05 NOTE — PROGRESS NOTES

## 2020-06-05 NOTE — DISCHARGE INSTRUCTIONS
Shahab Caraballo  [id number]  1950    COLON DISCHARGE INSTRUCTIONS  Discomfort:  Redness at IV site- apply warm compress to area; if redness or soreness persist- contact your physician  There may be a slight amount of blood passed from the rectum  Gaseous discomfort- walking, belching will help relieve any discomfort  You may not operate a vehicle for 12 hours  You may not engage in an occupation involving machinery or appliances for rest of today  You may not drink alcoholic beverages for at least 12 hours  Avoid making any critical decisions for at least 24 hour  DIET:   High fiber diet. - however -  remember your colon is empty and a heavy meal will produce gas. Avoid these foods:  vegetables, fried / greasy foods, carbonated drinks for today    MEDICATIONS:  resume       ACTIVITY:  You may resume your normal daily activities it is recommended that you spend the remainder of the day resting -  avoid any strenuous activity. CALL M.D. ANY SIGN OF:   Increasing pain, nausea, vomiting  Abdominal distension (swelling)  New increased bleeding (oral or rectal)  Fever (chills)  Pain in chest area  Bloody discharge from nose or mouth  Shortness of breath     Follow-up Instructions:   Call Desiree Carlisle MD if any questions or problems. Telephone # [phone]  Biopsy results will be available in  7 to10 days  Should have a repeat colonoscopy in 5 years. COLONOSCOPY FINDINGS:  Your colonoscopy showed: diverticulosis. Patient Education        Diverticulosis: Care Instructions  Your Care Instructions  In diverticulosis, pouches called diverticula form in the wall of the large intestine (colon). The pouches do not cause any pain or other symptoms. Most people who have diverticulosis do not know they have it. But the pouches sometimes bleed, and if they become infected, they can cause pain and other symptoms. When this happens, it is called diverticulitis.   Diverticula form when pressure pushes the wall of the colon outward at certain weak points. A diet that is too low in fiber can cause diverticula. Follow-up care is a key part of your treatment and safety. Be sure to make and go to all appointments, and call your doctor if you are having problems. It's also a good idea to know your test results and keep a list of the medicines you take. How can you care for yourself at home? · Include fruits, leafy green vegetables, beans, and whole grains in your diet each day. These foods are high in fiber. · Take a fiber supplement, such as Citrucel or Metamucil, every day if needed. Read and follow all instructions on the label. · Drink plenty of fluids, enough so that your urine is light yellow or clear like water. If you have kidney, heart, or liver disease and have to limit fluids, talk with your doctor before you increase the amount of fluids you drink. · Get at least 30 minutes of exercise on most days of the week. Walking is a good choice. You also may want to do other activities, such as running, swimming, cycling, or playing tennis or team sports. · Cut out foods that cause gas, pain, or other symptoms. When should you call for help? Call your doctor now or seek immediate medical care if:  · You have belly pain. · You pass maroon or very bloody stools. · You have a fever. · You have nausea and vomiting. · You have unusual changes in your bowel movements or abdominal swelling. · You have burning pain when you urinate. · You have abnormal vaginal discharge. · You have shoulder pain. · You have cramping pain that does not get better when you have a bowel movement or pass gas. · You pass gas or stool from your urethra while urinating. Watch closely for changes in your health, and be sure to contact your doctor if you have any problems. Where can you learn more?   Go to http://selena-aleyda.info/  Enter P423 in the search box to learn more about \"Diverticulosis: Care Instructions. \"  Current as of: August 12, 2019               Content Version: 12.5  © 4087-9196 [employer]. Care instructions adapted under license by [employer] (which disclaims liability or warranty for this information). If you have questions about a medical condition or this instruction, always ask your healthcare professional. [address] any warranty or liability for your use of this information.

## 2020-12-10 ENCOUNTER — HOSPITAL ENCOUNTER (OUTPATIENT)
Dept: PHYSICAL THERAPY | Age: 70
Discharge: HOME | End: 2020-12-10
Payer: COMMERCIAL

## 2020-12-10 PROCEDURE — 97161 PT EVAL LOW COMPLEX 20 MIN: CPT

## 2020-12-10 PROCEDURE — 97110 THERAPEUTIC EXERCISES: CPT

## 2020-12-10 PROCEDURE — 97016 VASOPNEUMATIC DEVICE THERAPY: CPT

## 2020-12-10 NOTE — PROGRESS NOTES
PT INITIAL EVALUATION NOTE - [facility] 2-15    Patient Name: Jenny Logan  Date:12/10/2020  : 1950  [x]  Patient  Verified  Payor: Joshua Letters / Plan: [address] [place] [address] / Product Type: PPO /    In time:10:18 am  Out time:11:22 am  Total Treatment Time (min): 64  Total Timed Codes (min): 10  1:1 Treatment Time ( only): 10   Visit #: 1     Treatment Area: Left ankle pain [M25.572]  Left foot pain [M79.672]    SUBJECTIVE  Pain Level (0-10 scale): 2-3  Any medication changes, allergies to medications, adverse drug reactions, diagnosis change, or new procedure performed?: [] No    [x] Yes (see summary sheet for update)  Subjective:    Pt was referred to skilled PT for left foot and ankle pain. Pt is S/P L peroneus longus to peroneus brevis tendon transfer, peroneus brevis tendon repair and insertional secondary reconstruction, modified Garrett procedure, peroneal tenolyses, subtalar joint capsulotomy and debridement, excision of os peroneum from peroneus longus 11/3/20. Pt reports he is doing well overall. He experiences pain/swelling when he's been up on his feet for a while. Most pain behind lateral malleolus at this point. Mechanism of Injury: Pt inverted his ankle 2 years ago and was later diagnosed with a peroneus brevis injury. He went through PT and other conservative treatment, though continued symptoms. A year later, he fell off his knee scooter and tore his anterior talofibular ligament. Surgery 11/3/20: He was in a cast for 2 weeks and has been in boot and NWB since then. They want him out of boot next week and to \"see what he can do in weight-bearing. \" He primarily uses his rolling walker at home. Also has knee scooter at home. Goes up and down stairs on his bottom. Surgery performed by Azam Rocha MD with [facility] in [place]. Appt with josue yesterday. He was told not to do any inversion/eversion up to this point. \"Insurance runs out at end of this month. \" (Out-of-pocket max resets on January 1st, 2021)    PLOF: \"My general tendency is to overdo it. \"  Exercises and cycles frequently; very active lifestyle; lives in the [place] (walks frequently). Has a stationary bike at home. Work Hx: Retired businessman      PMHx/Surgical Hx: 3 left patellar tendon debridement surgeries; prostate cancer 15 years ago, peripheral neuropathy (from radiation exposure in [facility])     Pt Goals: \"I want to talk as far as I need to walk without issue. \" \"I'm a cyclist; I want to get back to cycling for an hour. \"    OBJECTIVE/EXAMINATION  Posture:  --  Other Observations:  6 cm incision posterior to lateral malleolus, 3 cm incision along ATFL, 3.5 cm incision along lateral 5th met. No evidence of infection; 1 cm bleeding at distal aspect of incision posterior to lateral malleolus. Pt ambulating with rolling walker and boot  Gait and Functional Mobility:    Palpation: Most TTP along incision posterior to lateral malleolus and along inferior 5th metatarsal    Figure-8  L: 59 cm  R: 55 cm    L ankle ROM:   AROM   PROM   Flexion /DF  Lacking 13  Lacking 8   Extension/PF  30 p!   --   IR/Sup/Ev  0   0   ER/Pron/Inv  0   2    R ankle ROM:   AROM   PROM   Flexion /DF  Lacking 5  Lacking 2   Extension/PF  50   NT   IR/Sup/Ev  0   0   ER/Pron/Inv  8   10    Joint Mobility Assessment: Talocrural hypomobility    LOWER QUARTER   MUSCLE STRENGTH  KEY       R  L  0 - No Contraction  Knee ext  NT  NT  1 - Trace            flex  NT  NT  2 - Poor   Hip ext   NT  NT  3 - Fair          flex   NT  NT  4 - Good         abd  NT  NT  5 - Normal         add  NT  NT      Ankle DF  5  5                PF  5  5                INV  5  p! EV  5  NT    Neurological: Reflexes / Sensations: Intact light touch sensation along left foot    Modality rationale: decrease inflammation and decrease pain to improve the patients ability to ambulate with decreased pain or discomfort.    Min Type Additional Details    [] Estim: []Att   []Unatt        []TENS instruct                  []IFC  []Premod   []NMES                     []Other:  []w/US   []w/ice   []w/heat  Position:  Location:    []  Traction: [] Cervical       []Lumbar                       [] Prone          []Supine                       []Intermittent   []Continuous Lbs:  [] before manual  [] after manual  []w/heat    []  Ultrasound: []Continuous   [] Pulsed at:                           []1MHz   []3MHz Location:  W/cm2:    [] Paraffin         Location:   []w/heat    []  Ice     []  Heat  []  Ice massage Position:  Location:    []  Laser  []  Other: Position:  Location:   10   [x]  Vasopneumatic Device Pressure:       [x] lo [] med [] hi   Temperature: 34     [x] Skin assessment post-treatment:  [x]intact []redness- no adverse reaction    []redness  adverse reaction:     10 min Therapeutic Exercise:  [] See flow sheet :   Rationale: increase ROM, increase strength and increase proprioception to improve the patients ability to ambulate with decreased pain or discomfort. With   [] TE   [] TA   [] neuro   [] other: Patient Education: [x] Review HEP    [] Progressed/Changed HEP based on:   [] positioning   [] body mechanics   [] transfers   [] heat/ice application    [x] other: Educated Pt regarding impairments, role of PT, and POC.        Other Objective/Functional Measures: --    Pain Level (0-10 scale) post treatment: 2-3    ASSESSMENT/Changes in Function:     [x]  See Plan of [address] PT, DPT 12/10/2020

## 2020-12-10 NOTE — PROGRESS NOTES
Physical Therapy at [facility],   a part of [address] [place] [address]  Phone: [phone]  Fax: [phone]      Plan of Care/Statement of Necessity for Physical Therapy Services  2-15    Patient name: Brooke Beck  : 1950  Provider#: [id number]  Referral source: Mayank Kumar*      Medical/Treatment Diagnosis: Left ankle pain [M25.572]  Left foot pain [M79.672]     Prior Hospitalization: see medical history     Comorbidities: Arthritis, prostate cancer, three left patellar tendon surgeries  Prior Level of Function: Patient completed 20 minutes of exercise at least 3 times a week. Medications: Verified on Patient Summary List    Start of Care: 12/10/20      Onset Date: 11/3/20       The Plan of Care and following information is based on the information from the initial evaluation. Assessment/ key information: Pt is a very pleasant and motivated 65year old male who was referred to skilled PT S/P L peroneus longus to peroneus brevis tendon transfer, peroneus brevis tendon repair and insertional secondary reconstruction, modified Garrett procedure, peroneal tenolyses, subtalar joint capsulotomy and debridement, excision of os peroneum from peroneus longus 11/3/20. Pt would benefit from physical therapy to improve foot/ankle ROM and mobility, restore normal gait mechanics, improve proprioception/balance, and restore function with minimal pain. Evaluation Complexity History MEDIUM  Complexity : 1-2 comorbidities / personal factors will impact the outcome/ POC ; Examination LOW Complexity : 1-2 Standardized tests and measures addressing body structure, function, activity limitation and / or participation in recreation  ;Presentation LOW Complexity : Stable, uncomplicated  ;   Overall Complexity Rating: LOW     Problem List: pain affecting function, decrease ROM, decrease strength, edema affecting function, impaired gait/ balance, decrease ADL/ functional abilitiies, decrease activity tolerance and decrease flexibility/ joint mobility   Treatment Plan may include any combination of the following: Therapeutic exercise, Therapeutic activities, Neuromuscular re-education, Physical agent/modality, Gait/balance training, Manual therapy, Patient education, Self Care training and Functional mobility training  Patient / Family readiness to learn indicated by: asking questions, trying to perform skills and interest  Persons(s) to be included in education: patient (P)  Barriers to Learning/Limitations: None  Patient Goal (s): Progress from post-surgical pain/stiffness, begin to bear weight  Patient Self Reported Health Status: good  Rehabilitation Potential: good    Short and Long Term Goals: To be accomplished in 16 treatments:   1. Pt will be independent and compliant with HEP. 2) Pt will be able to ambulate on level surface >/=1 mile without increase of pain  3) Pt will be able to navigate stairs without pain. 4) Pt will be able to navigate uneven terrain without foot/ankle pain or instability. 5) Pt will be able to return to cycling >/= 1 hour without increased foot/ankle pain. Frequency / Duration: Patient to be seen 2-3 times per week for 16 treatments. Patient/ Caregiver education and instruction: self care, activity modification and exercises    [x]  Plan of care has been reviewed with PTA    Certification Period: 12/10/20 - 3/10/20    Holden Montemayor PT, DPT 12/10/2020     ________________________________________________________________________    I certify that the above Therapy Services are being furnished while the patient is under my care. I agree with the treatment plan and certify that this therapy is necessary.     [de-identified] Signature:____________________  Date:____________Time: _________

## 2020-12-11 ENCOUNTER — HOSPITAL ENCOUNTER (OUTPATIENT)
Dept: PHYSICAL THERAPY | Age: 70
Discharge: HOME | End: 2020-12-11
Payer: COMMERCIAL

## 2020-12-11 PROCEDURE — 97110 THERAPEUTIC EXERCISES: CPT

## 2020-12-11 PROCEDURE — 97016 VASOPNEUMATIC DEVICE THERAPY: CPT

## 2020-12-11 NOTE — PROGRESS NOTES
PT DAILY TREATMENT NOTE - [facility] 2-15    Patient Name: Aron King  Date:2020  : 1950  [x]  Patient  Verified  Payor: BLUE CROSS / Plan: [address] [place] [address] / Product Type: PPO /    In time:1125  Out time:1215  Total Treatment Time (min): 50  Total Timed Codes (min): 35  1:1 Treatment Time ( only): 35   Visit #:  2    Treatment Area: Left ankle pain [M25.572]  Left foot pain [M79.672]    SUBJECTIVE  Pain Level (0-10 scale): 4/10  Any medication changes, allergies to medications, adverse drug reactions, diagnosis change, or new procedure performed?: [x] No    [] Yes (see summary sheet for update)  Subjective functional status/changes:   [] No changes reported  Reports feeling good after eval yesterday. Comes in on crutches today. OBJECTIVE    Modality rationale: decrease inflammation and decrease pain to improve the patients ability to ambulate with decreased pain or discomfort. Min Type Additional Details     []? Estim: []? Att   []? Unatt        []? TENS instruct                  []?IFC  []? Premod   []? NMES                     []?Other:  []?w/US   []?w/ice   []?w/heat  Position:  Location:     []? Traction: []? Cervical       []? Lumbar                       []? Prone          []? Supine                       []?Intermittent   []? Continuous Lbs:  []? before manual  []? after manual  []?w/heat     []? Ultrasound: []? Continuous   []? Pulsed at:                           []? 1MHz   []? 3MHz Location:  W/cm2:     []? Paraffin         Location:   []?w/heat     []? Ice     []? Heat  []? Ice massage Position:  Location:     []? Laser  []? Other: Position:  Location:   10    [x]? Vasopneumatic Device Pressure:       [x]? lo []? med []? hi   Temperature: 34      [x]? Skin assessment post-treatment:  [x]? intact []? redness- no adverse reaction    []? redness  adverse reaction:      35 min Therapeutic Exercise:  [x]?  See flow sheet :   Rationale: increase ROM, increase strength and increase proprioception to improve the patients ability to ambulate with decreased pain or discomfort. With   []? TE   []? TA   []? neuro   []? other: Patient Education: [x]? Review HEP    []? Progressed/Changed HEP based on:   []? positioning   []? body mechanics   []? transfers   []? heat/ice application    [x]? other: Educated Pt regarding impairments, role of PT, and POC.           Other Objective/Functional Measures: --     Pain Level (0-10 scale) post treatment: 2/10    ASSESSMENT/Changes in Function:   Stiff in the DF range, mild difficulty with BAPS for proprioception. No increase in pain Recommended bringing in L sneaker to initiate weight bearing exercises next session. Patient will continue to benefit from skilled PT services to modify and progress therapeutic interventions, address functional mobility deficits, address ROM deficits, address strength deficits, analyze and address soft tissue restrictions, analyze and cue movement patterns, analyze and modify body mechanics/ergonomics and assess and modify postural abnormalities to attain remaining goals. []  See Plan of Care  []  See progress note/recertification  []  See Discharge Summary         Progress towards goals / Updated goals:  Short and Long Term Goals: To be accomplished in 16 treatments:               1. Pt will be independent and compliant with HEP. 2) Pt will be able to ambulate on level surface >/=1 mile without increase of pain  3) Pt will be able to navigate stairs without pain. 4) Pt will be able to navigate uneven terrain without foot/ankle pain or instability. 5) Pt will be able to return to cycling >/= 1 hour without increased foot/ankle pain.     PLAN  [x]  Upgrade activities as tolerated     []  Continue plan of care  []  Update interventions per flow sheet       []  Discharge due to:_  []  Other:_      Luis Kolb, PT 12/11/2020

## 2020-12-14 ENCOUNTER — HOSPITAL ENCOUNTER (OUTPATIENT)
Dept: PHYSICAL THERAPY | Age: 70
Discharge: HOME | End: 2020-12-14
Payer: COMMERCIAL

## 2020-12-14 PROCEDURE — 97016 VASOPNEUMATIC DEVICE THERAPY: CPT | Performed by: PHYSICAL THERAPIST

## 2020-12-14 PROCEDURE — 97110 THERAPEUTIC EXERCISES: CPT | Performed by: PHYSICAL THERAPIST

## 2020-12-14 PROCEDURE — 97140 MANUAL THERAPY 1/> REGIONS: CPT | Performed by: PHYSICAL THERAPIST

## 2020-12-14 NOTE — PROGRESS NOTES
PT DAILY TREATMENT NOTE - [facility] 2-15    Patient Name: Marty Alves  Date:2020  : 1950  [x]  Patient  Verified  Payor: BLUE CROSS / Plan: [address] [place] [address] / Product Type: PPO /    In time:1100  Out time:1153  Total Treatment Time (min): 53  Total Timed Codes (min): 43  1:1 Treatment Time ( only): 40  Visit #:  3    Treatment Area: Left ankle pain [M25.572]  Left foot pain [M79.672]    SUBJECTIVE  Pain Level (0-10 scale): 4/10  Any medication changes, allergies to medications, adverse drug reactions, diagnosis change, or new procedure performed?: [x] No    [] Yes (see summary sheet for update)  Subjective functional status/changes:   [] No changes reported  Reports feeling good, he has been walking in his shoe at home with his crutches. OBJECTIVE    Modality rationale: decrease inflammation and decrease pain to improve the patients ability to ambulate with decreased pain or discomfort. Min Type Additional Details     []? Estim: []? Att   []? Unatt        []? TENS instruct                  []?IFC  []? Premod   []? NMES                     []?Other:  []?w/US   []?w/ice   []?w/heat  Position:  Location:     []? Traction: []? Cervical       []? Lumbar                       []? Prone          []? Supine                       []?Intermittent   []? Continuous Lbs:  []? before manual  []? after manual  []?w/heat     []? Ultrasound: []? Continuous   []? Pulsed at:                           []? 1MHz   []? 3MHz Location:  W/cm2:     []? Paraffin         Location:   []?w/heat     []? Ice     []? Heat  []? Ice massage Position:  Location:     []? Laser  []? Other: Position:  Location:   10    [x]? Vasopneumatic Device Pressure:       [x]? lo []? med []? hi   Temperature: 34      [x]? Skin assessment post-treatment:  [x]? intact []? redness- no adverse reaction    []? redness  adverse reaction:      35 min Therapeutic Exercise:  [x]?  See flow sheet :PROM into DF   Rationale: increase ROM, increase strength and increase proprioception to improve the patients ability to ambulate with decreased pain or discomfort. 8 min Manual Interventions: STM to achilles, gastroc and soleus. Plantar fascia. Rationale: increase ROM, increase strength and increase proprioception to improve the patients ability to ambulate with decreased pain or discomfort. With   []? TE   []? TA   []? neuro   []? other: Patient Education: [x]? Review HEP    []? Progressed/Changed HEP based on:   []? positioning   []? body mechanics   []? transfers   []? heat/ice application    [x]? other: Educated Pt regarding impairments, role of PT, and POC.           Other Objective/Functional Measures: --     Pain Level (0-10 scale) post treatment: 2/10    ASSESSMENT/Changes in Function:   Pt did well today with progression into shoe for weight shifts and bike. Patient will continue to benefit from skilled PT services to modify and progress therapeutic interventions, address functional mobility deficits, address ROM deficits, address strength deficits, analyze and address soft tissue restrictions, analyze and cue movement patterns, analyze and modify body mechanics/ergonomics and assess and modify postural abnormalities to attain remaining goals. [x]  See Plan of Care  []  See progress note/recertification  []  See Discharge Summary         Progress towards goals / Updated goals:  Short and Long Term Goals: To be accomplished in 16 treatments:               1. Pt will be independent and compliant with HEP. 2) Pt will be able to ambulate on level surface >/=1 mile without increase of pain  3) Pt will be able to navigate stairs without pain. 4) Pt will be able to navigate uneven terrain without foot/ankle pain or instability. 5) Pt will be able to return to cycling >/= 1 hour without increased foot/ankle pain.     PLAN  [x]  Upgrade activities as tolerated     [x]  Continue plan of care  []  Update interventions per flow sheet       []  Discharge due to:_  []  Other:_      Claribel Rosales, PT, DPT 12/14/2020

## 2020-12-16 ENCOUNTER — HOSPITAL ENCOUNTER (OUTPATIENT)
Dept: PHYSICAL THERAPY | Age: 70
Discharge: HOME | End: 2020-12-16
Payer: COMMERCIAL

## 2020-12-16 PROCEDURE — 97140 MANUAL THERAPY 1/> REGIONS: CPT

## 2020-12-16 PROCEDURE — 97016 VASOPNEUMATIC DEVICE THERAPY: CPT

## 2020-12-16 PROCEDURE — 97110 THERAPEUTIC EXERCISES: CPT

## 2020-12-16 NOTE — PROGRESS NOTES
PT DAILY TREATMENT NOTE - [facility] 2-15    Patient Name: Sara Donahue  Date:2020  : 1950  [x]  Patient  Verified  Payor: Gus Hobson / Plan: [address] [place] [address] / Product Type: PPO /    In time:1:12P  Out time: 2:30P  Total Treatment Time (min): 78  Total Timed Codes (min): 68  1:1 Treatment Time ( only): 63   Visit #:  4    Treatment Area: Left ankle pain [M25.572]  Left foot pain [M79.672]    SUBJECTIVE  Pain Level (0-10 scale): 3/10  Any medication changes, allergies to medications, adverse drug reactions, diagnosis change, or new procedure performed?: [x]? No    []? Yes (see summary sheet for update)  Subjective functional status/changes:   []? No changes reported  Pt reported doing well this afternoon. Was not too sore after last time. OBJECTIVE            Modality rationale: decrease inflammation and decrease pain to improve the patients ability to ambulate with decreased pain or discomfort.     Min Type Additional Details     []? ? Estim: []??Att   []? ? Unatt        []? ?TENS instruct                  []? ?IFC  []? ?Premod   []? ?NMES                     []? ? Other:  []??w/US   []? ?w/ice   []? ?w/heat  Position:  Location:     []? ?  Traction: []?? Cervical       []? ?Lumbar                       []? ? Prone          []? ?Supine                       []? ?Intermittent   []? ? Continuous Lbs:  []?? before manual  []? ? after manual  []? ?w/heat     []? ?  Ultrasound: []??Continuous   []? ? Pulsed at:                           []? ?1MHz   []? ? 3MHz Location:  W/cm2:     []? ? Paraffin         Location:   []??w/heat     []? ?  Ice     []? ?  Heat  []? ?  Ice massage Position:  Location:     []? ?  Laser  []? ?  Other: Position:  Location:   10    [x]? ?  Vasopneumatic Device Pressure:       [x]? ? lo []? ? med []?? hi   Temperature: 34      [x]? ? Skin assessment post-treatment:  [x]? ? intact []? ?redness- no adverse reaction    []? ?redness  adverse reaction:      53 min Therapeutic Exercise:  [x]? ? See flow sheet :PROM into DF   Rationale: increase ROM, increase strength and increase proprioception to improve the patients ability to ambulate with decreased pain or discomfort.        15 min Manual Interventions: STM to achilles, gastroc and soleus. Plantar fascia. Rationale: increase ROM, increase strength and increase proprioception to improve the patients ability to ambulate with decreased pain or discomfort.                                                                 With   []?? TE   []?? TA   []?? neuro   []?? other: Patient Education: [x]? ? Review HEP    []? ? Progressed/Changed HEP based on:   []?? positioning   []? ? body mechanics   []? ? transfers   []? ? heat/ice application    [x]? ? other: Educated Pt regarding impairments, role of PT, and POC.           Other Objective/Functional Measures: --        Pain Level (0-10 scale) post treatment: 0/10     ASSESSMENT/Changes in Function:   Pt tolerated session well. Able to increase time on bike without pain or discomfort. Patient will continue to benefit from skilled PT services to modify and progress therapeutic interventions, address functional mobility deficits, address ROM deficits, address strength deficits, analyze and address soft tissue restrictions, analyze and cue movement patterns, analyze and modify body mechanics/ergonomics and assess and modify postural abnormalities to attain remaining goals. [x]? See Plan of Care  []? See progress note/recertification  []? See Discharge Summary         Progress towards goals / Updated goals:  Short and Long Term Goals: To be accomplished in 16 treatments:               1. Pt will be independent and compliant with HEP. 2) Pt will be able to ambulate on level surface >/=1 mile without increase of pain  3) Pt will be able to navigate stairs without pain. 4) Pt will be able to navigate uneven terrain without foot/ankle pain or instability.   5) Pt will be able to return to cycling >/= 1 hour without increased foot/ankle pain.     PLAN  [x]? Upgrade activities as tolerated     [x]? Continue plan of care  []? Update interventions per flow sheet       []? Discharge due to:_  []?   Other:_            Mau Tobar PTA, [facility] 12/16/2020

## 2020-12-18 ENCOUNTER — HOSPITAL ENCOUNTER (OUTPATIENT)
Dept: PHYSICAL THERAPY | Age: 70
Discharge: HOME | End: 2020-12-18
Payer: COMMERCIAL

## 2020-12-18 PROCEDURE — 97110 THERAPEUTIC EXERCISES: CPT

## 2020-12-18 PROCEDURE — 97016 VASOPNEUMATIC DEVICE THERAPY: CPT

## 2020-12-18 PROCEDURE — 97140 MANUAL THERAPY 1/> REGIONS: CPT

## 2020-12-18 NOTE — PROGRESS NOTES
PT DAILY TREATMENT NOTE - [facility] 2-15    Patient Name: Atul Burgess  Date:2020  : 1950  [x]  Patient  Verified  Payor: Melville Cranker / Plan: [address] [place] [address] / Product Type: PPO /    In time:11:03  Out time: 12:10P  Total Treatment Time (min): 67  Total Timed Codes (min): 57  1:1 Treatment Time ( only): 55  Visit #:  5    Treatment Area: Left ankle pain [M25.572]  Left foot pain [M79.672]    SUBJECTIVE  Pain Level (0-10 scale): 2/10  Any medication changes, allergies to medications, adverse drug reactions, diagnosis change, or new procedure performed?: [x]? No    []? Yes (see summary sheet for update)  Subjective functional status/changes:   []? No changes reported  Pt reported he has been doing fine, no increased pain after sessions. He has come in without his boot today. Can only tolerate being on his feet up to 4 minutes at this point. OBJECTIVE            Modality rationale: decrease inflammation and decrease pain to improve the patients ability to ambulate with decreased pain or discomfort.     Min Type Additional Details     []? ? Estim: []??Att   []? ? Unatt        []? ?TENS instruct                  []? ?IFC  []? ?Premod   []? ?NMES                     []? ? Other:  []??w/US   []? ?w/ice   []? ?w/heat  Position:  Location:     []? ?  Traction: []?? Cervical       []? ?Lumbar                       []? ? Prone          []? ?Supine                       []? ?Intermittent   []? ? Continuous Lbs:  []?? before manual  []? ? after manual  []? ?w/heat     []? ?  Ultrasound: []??Continuous   []? ? Pulsed at:                           []? ?1MHz   []? ? 3MHz Location:  W/cm2:     []? ? Paraffin         Location:   []??w/heat     []? ?  Ice     []? ?  Heat  []? ?  Ice massage Position:  Location:     []? ?  Laser  []? ?  Other: Position:  Location:   10    [x]? ?  Vasopneumatic Device Pressure:       [x]? ? lo []? ? med []?? hi   Temperature: 34      [x]? ? Skin assessment post-treatment:  [x]? ? intact []? ?redness- no adverse reaction    []? ?redness  adverse reaction:      53 min Therapeutic Exercise:  [x]? ? See flow sheet :PROM into DF   Rationale: increase ROM, increase strength and increase proprioception to improve the patients ability to ambulate with decreased pain or discomfort.     15 min Manual Interventions: STM to achilles, proximal gastroc and soleus. Plantar fascia. Light cross-friction massage across incisions   Rationale: increase ROM, increase strength and increase proprioception to improve the patients ability to ambulate with decreased pain or discomfort.                                                                 With   []?? TE   []?? TA   []?? neuro   []?? other: Patient Education: [x]? ? Review HEP    []? ? Progressed/Changed HEP based on:   []?? positioning   []? ? body mechanics   []? ? transfers   []? ? heat/ice application    [x]? ? other: Educated Pt regarding impairments, role of PT, and POC.           Other Objective/Functional Measures: --        Pain Level (0-10 scale) post treatment: 0/10     ASSESSMENT/Changes in Function:   Increased tightness along proximal gastroc and distal HS; utilized STM to decrease muscle tightness, improve ankle ROM, and maintain knee ROM. Initiated gentle eversion/inversion AROM in long-sitting; Pt tolerated well without pain. Patient will continue to benefit from skilled PT services to modify and progress therapeutic interventions, address functional mobility deficits, address ROM deficits, address strength deficits, analyze and address soft tissue restrictions, analyze and cue movement patterns, analyze and modify body mechanics/ergonomics and assess and modify postural abnormalities to attain remaining goals. [x]? See Plan of Care  []? See progress note/recertification  []? See Discharge Summary         Progress towards goals / Updated goals:  Short and Long Term Goals: To be accomplished in 16 treatments:               1.  Pt will be independent and compliant with HEP. 2) Pt will be able to ambulate on level surface >/=1 mile without increase of pain  3) Pt will be able to navigate stairs without pain. 4) Pt will be able to navigate uneven terrain without foot/ankle pain or instability. 5) Pt will be able to return to cycling >/= 1 hour without increased foot/ankle pain.     PLAN  [x]? Upgrade activities as tolerated     [x]? Continue plan of care  []? Update interventions per flow sheet       []? Discharge due to:_  []?   Other:_            Hermes Portillo, PT, DPT, OPTA 12/18/2020

## 2020-12-21 ENCOUNTER — HOSPITAL ENCOUNTER (OUTPATIENT)
Dept: PHYSICAL THERAPY | Age: 70
Discharge: HOME | End: 2020-12-21
Payer: COMMERCIAL

## 2020-12-21 PROCEDURE — 97110 THERAPEUTIC EXERCISES: CPT

## 2020-12-21 PROCEDURE — 97016 VASOPNEUMATIC DEVICE THERAPY: CPT

## 2020-12-21 PROCEDURE — 97140 MANUAL THERAPY 1/> REGIONS: CPT

## 2020-12-21 NOTE — PROGRESS NOTES
PT DAILY TREATMENT NOTE - [facility] 2-15    Patient Name: Giovanna Minor  Date:2020  : 1950  [x]  Patient  Verified  Payor: Justine Morrissey / Plan: [address] [place] [address] / Product Type: PPO /    In time:12:47P  Out time: 2:10P  Total Treatment Time (min): 83  Total Timed Codes (min): 68  1:1 Treatment Time ( only): 62  Visit #:  6    Treatment Area: Left ankle pain [M25.572]  Left foot pain [M79.672]    SUBJECTIVE  Pain Level (0-10 scale): 2/10  Any medication changes, allergies to medications, adverse drug reactions, diagnosis change, or new procedure performed?: [x]? No    []? Yes (see summary sheet for update)  Subjective functional status/changes:   []? No changes reported  He is doing his exercises 2-3x/day and is icing consistently. He has been having right peroneal pain for a while now as well. OBJECTIVE            Modality rationale: decrease inflammation and decrease pain to improve the patients ability to ambulate with decreased pain or discomfort.     Min Type Additional Details     []? ? Estim: []??Att   []? ? Unatt        []? ?TENS instruct                  []? ?IFC  []? ?Premod   []? ?NMES                     []? ? Other:  []??w/US   []? ?w/ice   []? ?w/heat  Position:  Location:     []? ?  Traction: []?? Cervical       []? ?Lumbar                       []? ? Prone          []? ?Supine                       []? ?Intermittent   []? ? Continuous Lbs:  []?? before manual  []? ? after manual  []? ?w/heat     []? ?  Ultrasound: []??Continuous   []? ? Pulsed at:                           []? ?1MHz   []? ? 3MHz Location:  W/cm2:     []? ? Paraffin         Location:   []??w/heat     []? ?  Ice     []? ?  Heat  []? ?  Ice massage Position:  Location:     []? ?  Laser  []? ?  Other: Position:  Location:   15    [x]? ?  Vasopneumatic Device Pressure:       [x]? ? lo []? ? med []?? hi   Temperature: 34      [x]? ? Skin assessment post-treatment:  [x]? ? intact []? ?redness- no adverse reaction    []? ?redness  adverse reaction:    56 min Therapeutic Exercise:  [x]? ? See flow sheet :4-way light ankle PROM (DF with knee straight and flexed)   Rationale: increase ROM, increase strength and increase proprioception to improve the patients ability to ambulate with decreased pain or discomfort.     12 min Manual Interventions: STM to achilles, proximal gastroc and soleus. Grade I-II A-P and P-A talocrural mobs; Light cross-friction massage across incisions   Rationale: increase ROM, increase strength and increase proprioception to improve the patients ability to ambulate with decreased pain or discomfort.                                                                 With   []?? TE   []?? TA   []?? neuro   []?? other: Patient Education: [x]? ? Review HEP    []? ? Progressed/Changed HEP based on:   []?? positioning   []? ? body mechanics   []? ? transfers   []? ? heat/ice application    [x]? ? other: Educated Pt regarding impairments, role of PT, and POC.           Other Objective/Functional Measures: --        Pain Level (0-10 scale) post treatment: 0/10     ASSESSMENT/Changes in Function:   Provided grade I and II talocrural mobilizations for decreased pain and improved mobility. Significant tightness within gastroc and soleus musculature; utilized STM as well as PROM into DF with flexed and extended knee. Initiated band-resisted DF and PF in long-sitting; Pt able to tolerate without any discomfort. Patient will continue to benefit from skilled PT services to modify and progress therapeutic interventions, address functional mobility deficits, address ROM deficits, address strength deficits, analyze and address soft tissue restrictions, analyze and cue movement patterns, analyze and modify body mechanics/ergonomics and assess and modify postural abnormalities to attain remaining goals. [x]? See Plan of Care  []? See progress note/recertification  []?   See Discharge Summary         Progress towards goals / Updated goals:  Short and Long Term Goals: To be accomplished in 16 treatments:               1. Pt will be independent and compliant with HEP. 2) Pt will be able to ambulate on level surface >/=1 mile without increase of pain  3) Pt will be able to navigate stairs without pain. 4) Pt will be able to navigate uneven terrain without foot/ankle pain or instability. 5) Pt will be able to return to cycling >/= 1 hour without increased foot/ankle pain.     PLAN  [x]? Upgrade activities as tolerated     [x]? Continue plan of care  []? Update interventions per flow sheet       []? Discharge due to:_  []?   Other:_            Piedad Rivas, PT, DPT, OPTA 12/21/2020

## 2020-12-23 ENCOUNTER — HOSPITAL ENCOUNTER (OUTPATIENT)
Dept: PHYSICAL THERAPY | Age: 70
Discharge: HOME | End: 2020-12-23
Payer: COMMERCIAL

## 2020-12-23 PROCEDURE — 97110 THERAPEUTIC EXERCISES: CPT

## 2020-12-23 PROCEDURE — 97016 VASOPNEUMATIC DEVICE THERAPY: CPT

## 2020-12-23 PROCEDURE — 97140 MANUAL THERAPY 1/> REGIONS: CPT

## 2020-12-23 PROCEDURE — 97116 GAIT TRAINING THERAPY: CPT

## 2020-12-23 NOTE — PROGRESS NOTES
PT DAILY TREATMENT NOTE - [facility] 2-15    Patient Name: Desmond Denny  Date:2020  : 1950  [x]  Patient  Verified  Payor: Paris Maldonado / Plan: [address] [place] [address] / Product Type: PPO /    In time: 1300  Out time: 1420  Total Treatment Time (min): 80  Total Timed Codes (min): 70  1:1 Treatment Time ( only): 70   Visit #:  7    Treatment Area: Left ankle pain [M25.572]  Left foot pain [M79.672]    SUBJECTIVE  Pain Level (0-10 scale): 3/10  Any medication changes, allergies to medications, adverse drug reactions, diagnosis change, or new procedure performed?: [x]? No    []? Yes (see summary sheet for update)  Subjective functional status/changes:   []? No changes reported  Patient is sore today, no known reasons. OBJECTIVE            Modality rationale: decrease inflammation and decrease pain to improve the patients ability to ambulate with decreased pain or discomfort.     Min Type Additional Details     []? ? Estim: []??Att   []? ? Unatt        []? ?TENS instruct                  []? ?IFC  []? ?Premod   []? ?NMES                     []? ? Other:  []??w/US   []? ?w/ice   []? ?w/heat  Position:  Location:     []? ?  Traction: []?? Cervical       []? ?Lumbar                       []? ? Prone          []? ?Supine                       []? ?Intermittent   []? ? Continuous Lbs:  []?? before manual  []? ? after manual  []? ?w/heat     []? ?  Ultrasound: []??Continuous   []? ? Pulsed at:                           []? ?1MHz   []? ? 3MHz Location:  W/cm2:     []? ? Paraffin         Location:   []??w/heat     []? ?  Ice     []? ?  Heat  []? ?  Ice massage Position:  Location:     []? ?  Laser  []? ?  Other: Position:  Location:   10    [x]? ?  Vasopneumatic Device Pressure:       [x]? ? lo []? ? med []?? hi   Temperature: 34      [x]? ? Skin assessment post-treatment:  [x]? ? intact []? ?redness- no adverse reaction    []? ?redness  adverse reaction:      46 min Therapeutic Exercise:  [x]? ? See flow sheet :   Rationale: increase ROM, increase strength and increase proprioception to improve the patients ability to ambulate with decreased pain or discomfort.     14 min Manual Interventions: STM to achilles, proximal gastroc and soleus. Grade I-II A-P and P-A talocrural mobs; Light cross-friction massage across incisions   Rationale: increase ROM, increase strength and increase proprioception to improve the patients ability to ambulate with decreased pain or discomfort.                                                                 10 min Gait Training: Instructed in 4 point gait technique with Bilat axillary crutches with was a challenge to increase weight onto the LLE   Rationale: increase ROM, increase strength and increase proprioception to improve the patients ability to ambulate with decreased pain or discomfort.                                                                 With   []?? TE   []?? TA   []?? neuro   []?? other: Patient Education: [x]? ? Review HEP    []? ? Progressed/Changed HEP based on:   []?? positioning   []? ? body mechanics   []? ? transfers   []? ? heat/ice application    [x]? ? other: Educated Pt regarding impairments, role of PT, and POC.           Other Objective/Functional Measures: --        Pain Level (0-10 scale) post treatment: 0/10     ASSESSMENT/Changes in Function:   Progressing well with therapeutic exercises though ankle remains very stiff. Still having difficulty with increasing weight acceptance into the LLE without external support. Progressing gait but still requiring 2 crutches at this time. Patient will continue to benefit from skilled PT services to modify and progress therapeutic interventions, address functional mobility deficits, address ROM deficits, address strength deficits, analyze and address soft tissue restrictions, analyze and cue movement patterns, analyze and modify body mechanics/ergonomics and assess and modify postural abnormalities to attain remaining goals. [x]?   See Plan of Care  []? See progress note/recertification  []? See Discharge Summary         Progress towards goals / Updated goals:  Short and Long Term Goals: To be accomplished in 16 treatments:               1. Pt will be independent and compliant with HEP. 2) Pt will be able to ambulate on level surface >/=1 mile without increase of pain  3) Pt will be able to navigate stairs without pain. 4) Pt will be able to navigate uneven terrain without foot/ankle pain or instability. 5) Pt will be able to return to cycling >/= 1 hour without increased foot/ankle pain.     PLAN  [x]? Upgrade activities as tolerated     [x]? Continue plan of care  []? Update interventions per flow sheet       []? Discharge due to:_  []?   Other:_            Arturo Israel, PT, 12/23/2020

## 2020-12-29 ENCOUNTER — HOSPITAL ENCOUNTER (OUTPATIENT)
Dept: PHYSICAL THERAPY | Age: 70
Discharge: HOME | End: 2020-12-29
Payer: COMMERCIAL

## 2020-12-29 PROCEDURE — 97110 THERAPEUTIC EXERCISES: CPT

## 2020-12-29 PROCEDURE — 97140 MANUAL THERAPY 1/> REGIONS: CPT

## 2020-12-29 PROCEDURE — 97016 VASOPNEUMATIC DEVICE THERAPY: CPT

## 2020-12-29 NOTE — PROGRESS NOTES
PT DAILY TREATMENT NOTE - [facility] 2-15    Patient Name: Claudene Phy  Date:2020  : 1950  [x]  Patient  Verified  Payor: Camryn Singleton / Plan: [address] [place] [address] / Product Type: PPO /    In time: 1:26 pm  Out time: 2:51 pm  Total Treatment Time (min): 85  Total Timed Codes (min): 70  1:1 Treatment Time ( only): 61   Visit #:  8    Treatment Area: Left ankle pain [M25.572]  Left foot pain [M79.672]    SUBJECTIVE  Pain Level (0-10 scale): 3/10  Any medication changes, allergies to medications, adverse drug reactions, diagnosis change, or new procedure performed?: [x]? No    []? Yes (see summary sheet for update)  Subjective functional status/changes:   []? No changes reported  Pt reports he experiences some pain in full WB with walking without assistance along incision outside of ankle. Doing well overall. OBJECTIVE            Modality rationale: decrease inflammation and decrease pain to improve the patients ability to ambulate with decreased pain or discomfort.     Min Type Additional Details     []? ? Estim: []??Att   []? ? Unatt        []? ?TENS instruct                  []? ?IFC  []? ?Premod   []? ?NMES                     []? ? Other:  []??w/US   []? ?w/ice   []? ?w/heat  Position:  Location:     []? ?  Traction: []?? Cervical       []? ?Lumbar                       []? ? Prone          []? ?Supine                       []? ?Intermittent   []? ? Continuous Lbs:  []?? before manual  []? ? after manual  []? ?w/heat     []? ?  Ultrasound: []??Continuous   []? ? Pulsed at:                           []? ?1MHz   []? ? 3MHz Location:  W/cm2:     []? ? Paraffin         Location:   []??w/heat     []? ?  Ice     []? ?  Heat  []? ?  Ice massage Position:  Location:     []? ?  Laser  []? ?  Other: Position:  Location:   10    [x]? ?  Vasopneumatic Device Pressure:       []? ? lo [x]? ? med []?? hi   Temperature: 34      [x]? ? Skin assessment post-treatment:  [x]? ? intact []? ?redness- no adverse reaction    []? ?redness Suzie Reed reaction:      48 min Therapeutic Exercise:  [x]? ? See flow sheet : Ankle PROM   Rationale: increase ROM, increase strength and increase proprioception to improve the patients ability to ambulate with decreased pain or discomfort.     18 min Manual Interventions: STM to achilles, proximal gastroc and soleus. Grade III A-P and P-A talocrural mobs; Light cross-friction massage across incisions   Rationale: increase ROM, increase strength and increase proprioception to improve the patients ability to ambulate with decreased pain or discomfort.                                                                                                                             With   []?? TE   []?? TA   []?? neuro   []?? other: Patient Education: [x]? ? Review HEP    []? ? Progressed/Changed HEP based on:   []?? positioning   []? ? body mechanics   []? ? transfers   []? ? heat/ice application    [x]? ? other: Educated Pt regarding impairments, role of PT, and POC.           Other Objective/Functional Measures: --        Pain Level (0-10 scale) post treatment: 0/10     ASSESSMENT/Changes in Function:   Pt demonstrated improved ankle ROM and talocrural mobility post-manual treatment today. Advanced exercises as tolerated; Pt able to perform without increased pain. Patient will continue to benefit from skilled PT services to modify and progress therapeutic interventions, address functional mobility deficits, address ROM deficits, address strength deficits, analyze and address soft tissue restrictions, analyze and cue movement patterns, analyze and modify body mechanics/ergonomics and assess and modify postural abnormalities to attain remaining goals. [x]? See Plan of Care  []? See progress note/recertification  []? See Discharge Summary         Progress towards goals / Updated goals:  Short and Long Term Goals: To be accomplished in 16 treatments:  1) Pt will be independent and compliant with HEP.   2) Pt will be able to ambulate on level surface >/=1 mile without increase of pain  3) Pt will be able to navigate stairs without pain. 4) Pt will be able to navigate uneven terrain without foot/ankle pain or instability. 5) Pt will be able to return to cycling >/= 1 hour without increased foot/ankle pain.     PLAN  [x]? Upgrade activities as tolerated     [x]? Continue plan of care  []? Update interventions per flow sheet       []? Discharge due to:_  []?   Other:_            Foreign Patel, PT, DPT, 12/29/2020

## 2021-01-06 ENCOUNTER — HOSPITAL ENCOUNTER (OUTPATIENT)
Dept: PHYSICAL THERAPY | Age: 71
Discharge: HOME | End: 2021-01-06
Payer: COMMERCIAL

## 2021-01-06 PROCEDURE — 97016 VASOPNEUMATIC DEVICE THERAPY: CPT | Performed by: PHYSICAL THERAPIST

## 2021-01-06 PROCEDURE — 97110 THERAPEUTIC EXERCISES: CPT | Performed by: PHYSICAL THERAPIST

## 2021-01-06 PROCEDURE — 97140 MANUAL THERAPY 1/> REGIONS: CPT | Performed by: PHYSICAL THERAPIST

## 2021-01-06 NOTE — PROGRESS NOTES
PT DAILY TREATMENT NOTE - [facility] -15    Patient Name: Vickey Blevins  Date:2021  : 1950  [x]  Patient  Verified  Payor: Tena Pascal / Plan: [address] [place] [address] / Product Type: PPO /    In time: 1:50 pm  Out time: 300 pm  Total Treatment Time (min): 70  Total Timed Codes (min): 55  1:1 Treatment Time ( only): 55   Visit #:  9    Treatment Area: Left ankle pain [M25.572]  Left foot pain [M79.672]    SUBJECTIVE  Pain Level (0-10 scale): 4/10  Any medication changes, allergies to medications, adverse drug reactions, diagnosis change, or new procedure performed?: [x]? No    []? Yes (see summary sheet for update)  Subjective functional status/changes:   []? No changes reported  Pt has been sore since the weekend. Not sure what I did but some times I have a flare up. OBJECTIVE            Modality rationale: decrease inflammation and decrease pain to improve the patients ability to ambulate with decreased pain or discomfort.     Min Type Additional Details     []? ? Estim: []??Att   []? ? Unatt        []? ?TENS instruct                  []? ?IFC  []? ?Premod   []? ?NMES                     []? ? Other:  []??w/US   []? ?w/ice   []? ?w/heat  Position:  Location:     []? ?  Traction: []?? Cervical       []? ?Lumbar                       []? ? Prone          []? ?Supine                       []? ?Intermittent   []? ? Continuous Lbs:  []?? before manual  []? ? after manual  []? ?w/heat     []? ?  Ultrasound: []??Continuous   []? ? Pulsed at:                           []? ?1MHz   []? ? 3MHz Location:  W/cm2:     []? ? Paraffin         Location:   []??w/heat     []? ?  Ice     []? ?  Heat  []? ?  Ice massage Position:  Location:     []? ?  Laser  []? ?  Other: Position:  Location:   15    [x]? ?  Vasopneumatic Device Pressure:       []? ? lo [x]? ? med []?? hi   Temperature: 34      [x]? ? Skin assessment post-treatment:  [x]? ? intact []? ?redness- no adverse reaction    []? ?redness  adverse reaction:      40 min Therapeutic Exercise:  [x]?? See flow sheet : Ankle PROM   Rationale: increase ROM, increase strength and increase proprioception to improve the patients ability to ambulate with decreased pain or discomfort.     15 min Manual Interventions: STM to achilles, proximal gastroc and soleus. Grade III A-P and P-A talocrural mobs; Light cross-friction massage across incisions   Rationale: increase ROM, increase strength and increase proprioception to improve the patients ability to ambulate with decreased pain or discomfort.                                                                                                                             With   []?? TE   []?? TA   []?? neuro   []?? other: Patient Education: [x]? ? Review HEP    []? ? Progressed/Changed HEP based on:   []?? positioning   []? ? body mechanics   []? ? transfers   []? ? heat/ice application    [x]? ? other: Educated Pt regarding impairments, role of PT, and POC.           Other Objective/Functional Measures: --        Pain Level (0-10 scale) post treatment: 0/10     ASSESSMENT/Changes in Function:   Pt tolerated without increased pain the progression of some exercises today. Added in standing hip extension and abduction, side stepping at the counter and SLS for 10 seconds using UE support Did have some soreness into peroneal in weightbearing and walking today and unable to push off due to weakness into calf. Encouraged to very gradually increase the time on his bike at home since he reports receiving the new pedals. Patient will continue to benefit from skilled PT services to modify and progress therapeutic interventions, address functional mobility deficits, address ROM deficits, address strength deficits, analyze and address soft tissue restrictions, analyze and cue movement patterns, analyze and modify body mechanics/ergonomics and assess and modify postural abnormalities to attain remaining goals. [x]? See Plan of Care  []?   See progress note/recertification  []? See Discharge Summary         Progress towards goals / Updated goals:  Short and Long Term Goals: To be accomplished in 16 treatments:  1) Pt will be independent and compliant with HEP. 2) Pt will be able to ambulate on level surface >/=1 mile without increase of pain  3) Pt will be able to navigate stairs without pain. 4) Pt will be able to navigate uneven terrain without foot/ankle pain or instability. 5) Pt will be able to return to cycling >/= 1 hour without increased foot/ankle pain.     PLAN  [x]? Upgrade activities as tolerated     [x]? Continue plan of care  []? Update interventions per flow sheet       []? Discharge due to:_  []?   Other:_            Alycia Jon, PT, DPT, 1/6/2021

## 2021-01-13 ENCOUNTER — HOSPITAL ENCOUNTER (OUTPATIENT)
Dept: PHYSICAL THERAPY | Age: 71
Discharge: HOME | End: 2021-01-13
Payer: COMMERCIAL

## 2021-01-13 PROCEDURE — 97016 VASOPNEUMATIC DEVICE THERAPY: CPT

## 2021-01-13 PROCEDURE — 97140 MANUAL THERAPY 1/> REGIONS: CPT

## 2021-01-13 PROCEDURE — 97110 THERAPEUTIC EXERCISES: CPT

## 2021-01-13 PROCEDURE — 97112 NEUROMUSCULAR REEDUCATION: CPT

## 2021-01-13 NOTE — PROGRESS NOTES
Physical Therapy at [facility],   a part of [address] [place] [address]  [address] [address], [address] [place] [address], [facility] Julieta Dunne   Phone: [phone] Fax: [phone]    Progress Note    Name: Ty Lopez   : 1950   MD: Lino Strauss*       Treatment Diagnosis: Left ankle pain [M25.572]  Left foot pain [M79.672]  Start of Care: 12/10/20    Visits from Start of Care: 10  Missed Visits: 0    Summary of Care:Mr. Lenin Henson has been seen for 10 skilled physical therapy visits S/P left peroneus longus to peroneus brevis tendon transfer, peroneus brevis tendon repair and insertional secondary reconstruction, modified Garrett procedure, peroneal tenolyses, subtalar joint capsulotomy and debridement, excision of os peroneum from peroneus longus 11/3/20. Pt incisions are healing well with no signs of infection and foot/ankle ROM is improving appropriately. Pt is ambulating with one crutch at this point, though does note some increasing lateral ankle pain with WB and is able to tolerate up to 5 minutes walking secondary to pain at this point. We have begun progressing to more standing exercises and proprioception training which he has tolerated well. Pt would benefit from continued therapy to further improve ROM, improve gait mechanics, increase lower extremity strength, improve balance/proprioception and decrease pain with functional activity. Thank you for this referral!    Assessment / Recommendations:  Pt would benefit from continued therapy to further improve ROM, improve gait mechanics, increase lower extremity strength, improve balance/proprioception and decrease pain with functional activity.      L ankle ROM:                         AROM                         PROM              Flexion /DF                  Lacking 13   0               Lacking 8  +2              Extension/PF               30 p!      32                  -- 35              IR/Sup/Ev                    0     1                             0  2              ER/Pron/Inv                 0      5                            2    10     Progress towards goals / Updated goals:  Short and Long Term Goals: To be accomplished in 16 treatments:  1) Pt will be independent and compliant with HEP. - MET  2) Pt will be able to ambulate on level surface >/=1 mile without increase of pain - Progressing  3) Pt will be able to navigate stairs without pain. - Progressing  4) Pt will be able to navigate uneven terrain without foot/ankle pain or instability. - Progressing  5) Pt will be able to return to cycling >/= 1 hour without increased foot/ankle pain. - Progressing    Kim Schmidt, PT, DPT 1/13/2021     ________________________________________________________________________  NOTE TO PHYSICIAN:  Please complete the following and fax to: [facility]: Fax: [phone]. Stella Orellana Retain this original for your records. If you are unable to process this request in 24 hours, please contact our office.        ____ I have read the above report and request that my patient continue therapy with the following changes/special instructions:  ____ I have read the above report and request that my patient be discharged from therapy    Physician's Signature:_________________ Date:___________Time:__________

## 2021-01-13 NOTE — PROGRESS NOTES
PT DAILY TREATMENT NOTE - [facility] 2-15    Patient Name: Jaya Pantoja  Date:2021  : 1950  [x]  Patient  Verified  Payor: BLUE CROSS / Plan: [address] [place] [address] / Product Type: PPO /    In time: 1:40 pm  Out time: 3:10pm  Total Treatment Time (min): 90  Total Timed Codes (min): 75  1:1 Treatment Time ( only): 69   Visit #:  10    Treatment Area: Left ankle pain [M25.572]  Left foot pain [M79.672]    SUBJECTIVE  Pain Level (0-10 scale): 4  Any medication changes, allergies to medications, adverse drug reactions, diagnosis change, or new procedure performed?: [x]? No    []? Yes (see summary sheet for update)  Subjective functional status/changes:   []? No changes reported  Pt reports he still notes pain with WB, though a bit less in past week. He is only using 1 crutch for ambulation at this point. He feels like he can walk no longer than 5 minutes before increased pain/irritation just behind the ankle bone. OBJECTIVE            Modality rationale: decrease inflammation and decrease pain to improve the patients ability to ambulate with decreased pain or discomfort.     Min Type Additional Details     []? ? Estim: []??Att   []? ? Unatt        []? ?TENS instruct                  []? ?IFC  []? ?Premod   []? ?NMES                     []? ? Other:  []??w/US   []? ?w/ice   []? ?w/heat  Position:  Location:     []? ?  Traction: []?? Cervical       []? ?Lumbar                       []? ? Prone          []? ?Supine                       []? ?Intermittent   []? ? Continuous Lbs:  []?? before manual  []? ? after manual  []? ?w/heat     []? ?  Ultrasound: []??Continuous   []? ? Pulsed at:                           []? ?1MHz   []? ? 3MHz Location:  W/cm2:     []? ? Paraffin         Location:   []??w/heat     []? ?  Ice     []? ?  Heat  []? ?  Ice massage Position:  Location:     []? ?  Laser  []? ?  Other: Position:  Location:   15    [x]? ?  Vasopneumatic Device Pressure:       []? ? lo [x]? ? med []?? hi   Temperature: 34      [x]? ? Skin assessment post-treatment:  [x]? ? intact []? ?redness- no adverse reaction    []? ?redness  adverse reaction:      38 min Therapeutic Exercise:  [x]? ? See flow sheet : Ankle PROM   Rationale: increase ROM, increase strength and increase proprioception to improve the patients ability to ambulate with decreased pain or discomfort.     12 min Manual Interventions:  Grade III-IV A-P and P-A talocrural mobs; Grade I-II rearfoot eversion mobs (pain); Light cross-friction massage across incisions   Rationale: increase ROM, increase strength and increase proprioception to improve the patients ability to ambulate with decreased pain or discomfort. 25 min Neuromuscular Re-education:  []  See flow sheet : BAPS; Rockerboard; SLS with support on NC surface; 3/4 tandem without support on NC surface   Rationale: increase strength, improve balance and increase proprioception  to improve the patients ability to ambulate with decreased pain or discomfort.                                                                                                                             With   []?? TE   []?? TA   []?? neuro   []?? other: Patient Education: [x]? ? Review HEP    []? ? Progressed/Changed HEP based on:   []?? positioning   []? ? body mechanics   []? ? transfers   []? ? heat/ice application    [x]? ? other: Educated Pt regarding impairments, role of PT, and POC.         Other Objective/Functional Measures:     L ankle ROM:                         AROM                         PROM              Flexion /DF                  Lacking 13   0               Lacking 8  +2              Extension/PF               30 p!      32                  --  35              IR/Sup/Ev                    0     1                             0  2              ER/Pron/Inv                 0      5                            2    10     Pain Level (0-10 scale) post treatment: 0/10     ASSESSMENT/Changes in Function:     []? See Plan of Care  [x]?   See progress note/recertification  []? See Discharge Summary         Progress towards goals / Updated goals:  Short and Long Term Goals: To be accomplished in 16 treatments:  1) Pt will be independent and compliant with HEP. - MET  2) Pt will be able to ambulate on level surface >/=1 mile without increase of pain - Progressing  3) Pt will be able to navigate stairs without pain. - Progressing  4) Pt will be able to navigate uneven terrain without foot/ankle pain or instability. - Progressing  5) Pt will be able to return to cycling >/= 1 hour without increased foot/ankle pain. - Progressing     PLAN  [x]? Upgrade activities as tolerated     [x]? Continue plan of care  []? Update interventions per flow sheet       []? Discharge due to:_  []?   Other:_            Jose F Koo, PT, DPT, 1/13/2021

## 2021-01-21 ENCOUNTER — HOSPITAL ENCOUNTER (OUTPATIENT)
Dept: PHYSICAL THERAPY | Age: 71
Discharge: HOME | End: 2021-01-21
Payer: COMMERCIAL

## 2021-01-21 PROCEDURE — 97124 MASSAGE THERAPY: CPT

## 2021-01-21 PROCEDURE — 97016 VASOPNEUMATIC DEVICE THERAPY: CPT

## 2021-01-21 PROCEDURE — 97110 THERAPEUTIC EXERCISES: CPT

## 2021-01-21 PROCEDURE — 97112 NEUROMUSCULAR REEDUCATION: CPT

## 2021-01-21 NOTE — PROGRESS NOTES
PT DAILY TREATMENT NOTE - [facility] 2-15    Patient Name: Claudene Phy  Date:2021  : 1950  [x]  Patient  Verified  Payor: BLUE CROSS / Plan: [address] [place] [address] / Product Type: PPO /    In time:12:30 pm  Out time:1:47 pm  Total Treatment Time (min): 77   Total Timed Codes (min): 62   1:1 Treatment Time ( only): 53  Visit #: 11    Treatment Area: Left ankle pain [M25.572]  Left foot pain [M79.672]    SUBJECTIVE  Pain Level (0-10 scale): 3  Any medication changes, allergies to medications, adverse drug reactions, diagnosis change, or new procedure performed?: [x]? ? No    []? ? Yes (see summary sheet for update)  Subjective functional status/changes:   []? ? No changes reported  Pt reports he has been ambulating without his crutch for the past several days, but L ankle is painful with walking. He feels most limited with eversion. Reports HEP is going well. Has been icing frequently. Next appt with MD is .     OBJECTIVE                 Modality rationale: decrease inflammation and decrease pain to improve the patients ability to ambulate with decreased pain or discomfort.     Min Type Additional Details     []??? Estim: []? ? ? Att   []? ??Unatt        []? ??TENS instruct                  []? ??IFC  []? ? ?Premod   []? ??NMES                     []? ??Other:  []???w/US   []? ??w/ice   []? ??w/heat  Position:  Location:     []? ??  Traction: []??? Cervical       []? ? ?Lumbar                       []? ?? Prone          []? ? ?Supine                       []? ? ? Intermittent   []? ?? Continuous Lbs:  []??? before manual  []? ?? after manual  []? ??w/heat     []? ??  Ultrasound: []? ? ? Continuous   []? ?? Pulsed at:                           []? ??1MHz   []? ??3MHz Location:  W/cm2:     []??? Paraffin         Location:   []???w/heat     []? ??  Ice     []? ??  Heat  []? ??  Ice massage Position:  Location:     []? ??  Laser  []? ??  Other: Position:  Location:   15    [x]? ??  Vasopneumatic Device Pressure:       []? ?? lo [x]? ?? med []??? hi   Temperature: 34      [x]? ?? Skin assessment post-treatment:  [x]? ??intact []? ??redness- no adverse reaction    []? ??redness  adverse reaction:      25 min Therapeutic Exercise:  [x]? ?? See flow sheet : Ankle PROM, assessment and reassessment    Rationale: increase ROM, increase strength and increase proprioception to improve the patients ability to ambulate with decreased pain or discomfort.     14 min Manual Interventions:  Grade III-IV A-P and P-A talocrural mobs; Light cross-friction massage across incisions, IASTM to soleus and achilles tendon   Rationale: increase ROM, increase strength and increase proprioception to improve the patients ability to ambulate with decreased pain or discomfort.     23 min Neuromuscular Re-education:  []? See flow sheet : manually resisted eversion, 3/4 tandem on foam    Rationale: increase strength, improve balance and increase proprioception  to improve the patients ability to ambulate with decreased pain or discomfort.                                                                                                                             With   []??? TE   []??? TA   []??? neuro   []? ?? other: Patient Education: [x]??? Review HEP    []? ?? Progressed/Changed HEP based on:   []??? positioning   []? ?? body mechanics   []? ?? transfers   []? ?? heat/ice application    [x]? ?? other: Educated Pt regarding impairments, role of PT, and POC.         Other Objective/Functional Measures:      Pain Level (0-10 scale) post treatment: 0/10     ASSESSMENT/Changes in Function:   Discussed with pt about using crutches for ambulating to temporarily reduce pain/irritation in left ankle. Significant restrictions noted in soleus and achilles; utilized manual techniques to decrease tightness, though still limited DF ROM. Reviewed proper form with soleus stretch. Focus on eversion, calf, and intrinsic foot musculature strengthening.  Pt reports lateral ankle pain with mobility, even in gravity eliminated positions (TG). Patient will continue to benefit from skilled PT services to modify and progress therapeutic interventions, address functional mobility deficits, address ROM deficits, address strength deficits, analyze and address soft tissue restrictions, analyze and cue movement patterns, analyze and modify body mechanics/ergonomics and assess and modify postural abnormalities to attain remaining goals.      []? ?  See Plan of Care  [x]? ?  See progress note/recertification  []? ?  See Discharge Summary         Progress towards goals / Updated goals:  Short and Long Term Goals: To be accomplished in 16 treatments:  1) Pt will be independent and compliant with HEP. - MET  2) Pt will be able to ambulate on level surface >/=1 mile without increase of pain - Progressing  3) Pt will be able to navigate stairs without pain. - Progressing  4) Pt will be able to navigate uneven terrain without foot/ankle pain or instability. - Progressing  5) Pt will be able to return to cycling >/= 1 hour without increased foot/ankle pain. - Progressing     PLAN  [x]? ?  Upgrade activities as tolerated     [x]? ?  Continue plan of care  []? ?  Update interventions per flow sheet       []? ?  Discharge due to:_  []??  Other:_        Daniel Sanches, PT, DPT 1/21/2021

## 2021-01-28 ENCOUNTER — HOSPITAL ENCOUNTER (OUTPATIENT)
Dept: PHYSICAL THERAPY | Age: 71
Discharge: HOME | End: 2021-01-28
Payer: COMMERCIAL

## 2021-01-28 PROCEDURE — 97016 VASOPNEUMATIC DEVICE THERAPY: CPT

## 2021-01-28 PROCEDURE — 97112 NEUROMUSCULAR REEDUCATION: CPT

## 2021-01-28 PROCEDURE — 97110 THERAPEUTIC EXERCISES: CPT

## 2021-01-28 PROCEDURE — 97140 MANUAL THERAPY 1/> REGIONS: CPT

## 2021-01-28 NOTE — PROGRESS NOTES
PT DAILY TREATMENT NOTE - [facility] 2-15    Patient Name: Miguel Stoll  Date:2021  : 1950  [x]  Patient  Verified  Payor: Joss Song / Plan: [address] [place] [address] / Product Type: PPO /    In time:3:15 pm  Out time:4:30 pm  Total Treatment Time (min): 75  Total Timed Codes (min): 60  1:1 Treatment Time [facility] only):55  Visit #: 12    Treatment Area: Left ankle pain [M25.572]  Left foot pain [M79.672]    SUBJECTIVE  Pain Level (0-10 scale): 3  Any medication changes, allergies to medications, adverse drug reactions, diagnosis change, or new procedure performed?: [x]? ? No    []? ? Yes (see summary sheet for update)  Subjective functional status/changes:   []? ? No changes reported  Pt reports he saw the doctor yesterday who said his irritability with walking is normal.  Resisted eversion is uncomfortable; continued lateral ankle pain with heel raises, though can tolerate.      OBJECTIVE                 Modality rationale: decrease inflammation and decrease pain to improve the patients ability to ambulate with decreased pain or discomfort.     Min Type Additional Details     []??? Estim: []? ? ? Att   []? ??Unatt        []? ??TENS instruct                  []? ??IFC  []? ? ?Premod   []? ??NMES                     []? ??Other:  []???w/US   []? ??w/ice   []? ??w/heat  Position:  Location:     []? ??  Traction: []??? Cervical       []? ? ?Lumbar                       []? ?? Prone          []? ? ?Supine                       []? ? ? Intermittent   []? ?? Continuous Lbs:  []??? before manual  []? ?? after manual  []? ??w/heat     []? ??  Ultrasound: []? ? ? Continuous   []? ?? Pulsed at:                           []? ??1MHz   []? ??3MHz Location:  W/cm2:     []??? Paraffin         Location:   []???w/heat     []? ??  Ice     []? ??  Heat  []? ??  Ice massage Position:  Location:     []? ??  Laser  []? ??  Other: Position:  Location:   15    [x]? ??  Vasopneumatic Device Pressure:       []? ?? lo [x]? ?? med []? ?? hi   Temperature: 34      [x]? ??Ander Nicholson assessment post-treatment:  [x]? ??intact []? ??redness- no adverse reaction    []? ??redness  adverse reaction:      38 min Therapeutic Exercise:  [x]? ?? See flow sheet : Ankle PROM   Rationale: increase ROM, increase strength and increase proprioception to improve the patients ability to ambulate with decreased pain or discomfort.     9 min Manual Interventions:  Grade III-IV A-P and P-A talocrural mobs; Light cross-friction massage across incisions   Rationale: increase ROM, increase strength and increase proprioception to improve the patients ability to ambulate with decreased pain or discomfort.     13 min Neuromuscular Re-education:  []? See flow sheet : Rockerboard;  full tandem on NC surface; NBOS on foam   Rationale: increase strength, improve balance and increase proprioception  to improve the patients ability to ambulate with decreased pain or discomfort.                                                                                                                             With   []??? TE   []??? TA   []??? neuro   []? ?? other: Patient Education: [x]??? Review HEP    []? ?? Progressed/Changed HEP based on:   []??? positioning   []? ?? body mechanics   []? ?? transfers   []? ?? heat/ice application    [x]? ?? other: Educated Pt regarding impairments, role of PT, and POC.         Other Objective/Functional Measures:      Pain Level (0-10 scale) post treatment: 0/10     ASSESSMENT/Changes in Function:   Due to continued pain with HR, attempted to modify to seated again though Pt continued to note increased pain posterior to lateral malleolus; utilized black band resistance in long sitting for PF and Pt able to feel calf activation without increased pain. Pt demonstrating improved proprioception and decreased pain in WB; continue progressing as tolerated.   Patient will continue to benefit from skilled PT services to modify and progress therapeutic interventions, address functional mobility deficits, address ROM deficits, address strength deficits, analyze and address soft tissue restrictions, analyze and cue movement patterns, analyze and modify body mechanics/ergonomics and assess and modify postural abnormalities to attain remaining goals.      []? ?  See Plan of Care  [x]? ?  See progress note/recertification  []? ?  See Discharge Summary         Progress towards goals / Updated goals:  Short and Long Term Goals: To be accomplished in 16 treatments:  1) Pt will be independent and compliant with HEP. - MET  2) Pt will be able to ambulate on level surface >/=1 mile without increase of pain - Progressing  3) Pt will be able to navigate stairs without pain. - Progressing  4) Pt will be able to navigate uneven terrain without foot/ankle pain or instability. - Progressing  5) Pt will be able to return to cycling >/= 1 hour without increased foot/ankle pain. - Progressing     PLAN  [x]? ?  Upgrade activities as tolerated     [x]? ?  Continue plan of care  []? ?  Update interventions per flow sheet       []? ?  Discharge due to:_  []??  Other:_        Harvey Aranda, PT, DPT 1/28/2021

## 2021-02-03 ENCOUNTER — HOSPITAL ENCOUNTER (OUTPATIENT)
Dept: PHYSICAL THERAPY | Age: 71
Discharge: HOME | End: 2021-02-03
Payer: COMMERCIAL

## 2021-02-03 PROCEDURE — 97016 VASOPNEUMATIC DEVICE THERAPY: CPT

## 2021-02-03 PROCEDURE — 97140 MANUAL THERAPY 1/> REGIONS: CPT

## 2021-02-03 PROCEDURE — 97112 NEUROMUSCULAR REEDUCATION: CPT

## 2021-02-03 PROCEDURE — 97116 GAIT TRAINING THERAPY: CPT

## 2021-02-03 PROCEDURE — 97110 THERAPEUTIC EXERCISES: CPT

## 2021-02-03 NOTE — PROGRESS NOTES
PT DAILY TREATMENT NOTE - [facility] -15    Patient Name: Kiera Book  Date:2/3/2021  : 1950  [x]  Patient  Verified  Payor: [employer] / Plan: [address] [place] [address] / Product Type: PPO /    In time:1:45 pm  Out time:3:18 pm  Total Treatment Time (min): 93  Total Timed Codes (min): 78  1:1 Treatment Time ([facility] only): 59  Visit #: 13    Treatment Area: Left ankle pain [M25.572]  Left foot pain [M79.672]    SUBJECTIVE  Pain Level (0-10 scale): 2  Any medication changes, allergies to medications, adverse drug reactions, diagnosis change, or new procedure performed?: [x]? ? No    []? ? Yes (see summary sheet for update)  Subjective functional status/changes:   []? ? No changes reported  Pt reports he feels like he is making progress. He feels like he has a lot more strength in his forefoot and is able to tolerate HR with less pain. Less pain with stretches/ROM. Stiffness/pain most in mornings. Prolonged standing in one place still most aggravating; better with movement. OBJECTIVE                 Modality rationale: decrease inflammation and decrease pain to improve the patients ability to ambulate with decreased pain or discomfort.     Min Type Additional Details     []??? Estim: []? ? ? Att   []? ??Unatt        []? ??TENS instruct                  []? ??IFC  []? ? ?Premod   []? ??NMES                     []? ??Other:  []???w/US   []? ??w/ice   []? ??w/heat  Position:  Location:     []? ??  Traction: []??? Cervical       []? ? ?Lumbar                       []? ?? Prone          []? ? ?Supine                       []? ? ? Intermittent   []? ?? Continuous Lbs:  []??? before manual  []? ?? after manual  []? ??w/heat     []? ??  Ultrasound: []? ? ? Continuous   []? ?? Pulsed at:                           []? ??1MHz   []? ??3MHz Location:  W/cm2:     []??? Paraffin         Location:   []???w/heat     []? ??  Ice     []? ??  Heat  []? ??  Ice massage Position:  Location:     []? ??  Laser  []? ??  Other: Position:  Location:   15    [x]???  Vasopneumatic Device Pressure:       []? ?? lo [x]? ?? med []? ?? hi   Temperature: 34      [x]? ?? Skin assessment post-treatment:  [x]? ??intact []? ??redness- no adverse reaction    []? ??redness  adverse reaction:      40 min Therapeutic Exercise:  [x]? ?? See flow sheet : Ankle PROM   Rationale: increase ROM, increase strength and increase proprioception to improve the patients ability to ambulate with decreased pain or discomfort.     12 min Manual Interventions:  Grade III-IV A-P talocrural mobs; IASTM of gastroc-soleus complex and peroneal musculature in prone   Rationale: increase ROM, increase strength and increase proprioception to improve the patients ability to ambulate with decreased pain or discomfort.     10 min Neuromuscular Re-education:  []? See flow sheet : Rockerboard;  full tandem on NC and compliant surfaces; short foot in SLS with support   Rationale: increase strength, improve balance and increase proprioception  to improve the patients ability to ambulate with decreased pain or discomfort. 16 min Gait Training:  Hurdles with cuing for heel strike and weight shift; Resisted retro walking   Rationale: increase ROM, increase strength, improve coordination and increase proprioception  to improve the patients ability to ambulate with improved mechanics and decreased pain.                                                                                                                             With   []??? TE   []??? TA   []??? neuro   []? ?? other: Patient Education: [x]??? Review HEP    []? ?? Progressed/Changed HEP based on:   []??? positioning   []? ?? body mechanics   []? ?? transfers   []? ?? heat/ice application    [x]? ?? other: Educated Pt regarding impairments, role of PT, and POC.         Other Objective/Functional Measures:      Pain Level (0-10 scale) post treatment: 0/10     ASSESSMENT/Changes in Function:   Pt presents with improved DF ROM; utilized IASTM of gastroc-soleus and peroneal musculature to further improve due to calf restriction. Challenged Pt with hurdles cuing for weight shift and heel strike to improve proprioception, coordination, and decrease pain with ambulation; also utilized retro walking to improve strength, coordination, balance, and ROM. Continue progressing as tolerated. Patient will continue to benefit from skilled PT services to modify and progress therapeutic interventions, address functional mobility deficits, address ROM deficits, address strength deficits, analyze and address soft tissue restrictions, analyze and cue movement patterns, analyze and modify body mechanics/ergonomics and assess and modify postural abnormalities to attain remaining goals.      []? ?  See Plan of Care  [x]? ?  See progress note/recertification  []? ?  See Discharge Summary         Progress towards goals / Updated goals:  Short and Long Term Goals: To be accomplished in 16 treatments:  1) Pt will be independent and compliant with HEP. - MET  2) Pt will be able to ambulate on level surface >/=1 mile without increase of pain - Progressing  3) Pt will be able to navigate stairs without pain. - Progressing  4) Pt will be able to navigate uneven terrain without foot/ankle pain or instability. - Progressing  5) Pt will be able to return to cycling >/= 1 hour without increased foot/ankle pain. - Progressing     PLAN  [x]? ?  Upgrade activities as tolerated     [x]? ?  Continue plan of care  []? ?  Update interventions per flow sheet       []? ?  Discharge due to:_  []??  Other:_        Sana Coleman, PT, DPT 2/3/2021

## 2021-02-10 ENCOUNTER — HOSPITAL ENCOUNTER (OUTPATIENT)
Dept: PHYSICAL THERAPY | Age: 71
Discharge: HOME | End: 2021-02-10
Payer: COMMERCIAL

## 2021-02-10 PROCEDURE — 97016 VASOPNEUMATIC DEVICE THERAPY: CPT

## 2021-02-10 PROCEDURE — 97112 NEUROMUSCULAR REEDUCATION: CPT

## 2021-02-10 PROCEDURE — 97110 THERAPEUTIC EXERCISES: CPT

## 2021-02-10 PROCEDURE — 97140 MANUAL THERAPY 1/> REGIONS: CPT

## 2021-02-10 NOTE — PROGRESS NOTES
Physical Therapy at [facility],   a part of [address] [place] [address], [address]  Phone: [phone] Fax: [phone]    Progress Note    Name: Brown Rollins   : 1950   MD: Eboni José*       Treatment Diagnosis: Left ankle pain [M25.572]  Left foot pain [M79.672]  Start of Care: 12/10/21    Visits from Start of Care: 14  Missed Visits: 0    Summary of Care: Mr. Roseline Woo has been seen for 14 skilled physical therapy visits S/P L peroneus longus to peroneus brevis tendon transfer, peroneus brevis tendon repair and insertional secondary reconstruction, modified Garrett procedure, peroneal tenolyses, subtalar joint capsulotomy and debridement, excision of os peroneum from peroneus longus 11/3/20. Pt has demonstrated good recent progress and improvement over the past couple of weeks in particular. He demonstrates much improved foot/ankle ROM (shown below) and progressing lower extremity strength. Pt does note continued constant irritation along peroneal tendons posterior to lateral malleolus, particularly with standing or prolonged walking (10+ minutes). Utilizing stabilization training to further decrease pain in weight-bearing and improve proprioceptive input. Pt would benefit from continued therapy to further address impairments and improve tolerance in standing/walking. Thank you for this referral!    Assessment / Recommendations:  Pt would benefit from continued therapy to further address impairments and improve tolerance in standing/walking.       (Evaluation vs Today)  L ankle ROM:                         AROM                         PROM              Flexion /DF                  Lacking 62   +3               CCCPBFG 8  +9              Extension/PF               30 p!       67                  --  88              IR/Sup/Ev                    0     3                             0  9              ER/Pron/Inv                 0      8                            2    15       Short and Long Term Goals: To be accomplished in 16 treatments:  1) Pt will be independent and compliant with HEP.  - MET  2) Pt will be able to ambulate on level surface >/=1 mile without increase of pain - Progressing (1-2 blocks)  3) Pt will be able to navigate stairs without increased pain. - Progressing (mild discomfort descending)  4) Pt will be able to navigate uneven terrain without foot/ankle pain or instability. - Progressing  5) Pt will be able to return to cycling >/= 1 hour without increased foot/ankle pain. - Progressing (stationary without pain)    Sherice Haley, PT, DPT 2/10/2021     ________________________________________________________________________  NOTE TO PHYSICIAN:  Please complete the following and fax to: [facility]: Fax: [phone]. Matthias Coyle Retain this original for your records. If you are unable to process this request in 24 hours, please contact our office.        ____ I have read the above report and request that my patient continue therapy with the following changes/special instructions:  ____ I have read the above report and request that my patient be discharged from therapy    Physician's Signature:_________________ Date:___________Time:__________

## 2021-02-10 NOTE — PROGRESS NOTES
PT DAILY TREATMENT NOTE - [facility] 2-15    Patient Name: Jared Running  Date:2/10/2021  : 1950  [x]  Patient  Verified  Payor: [employer] / Plan: [address] [place] [address] / Product Type: PPO /    In time:3:10 pm  Out time:4:20 pm  Total Treatment Time (min): 70  Total Timed Codes (min): 60  1:1 Treatment Time ( only): 57  Visit #:  14    Treatment Area: Left ankle pain [M25.572]  Left foot pain [M79.672]    SUBJECTIVE  Pain Level (0-10 scale): 2  Any medication changes, allergies to medications, adverse drug reactions, diagnosis change, or new procedure performed?: [x]? ?? No    []??? Yes (see summary sheet for update)  Subjective functional status/changes:   []? ?? No changes reported  Pt reports his foot has been doing better and feels like his calves are getting stronger. He still does note increased pain posterior to lateral malleolus with standing in place; feels better with walking. Stairs are good, though descending backwards mostly due to patellar tendons, per Pt.     OBJECTIVE                 Modality rationale: decrease inflammation and decrease pain to improve the patients ability to ambulate with decreased pain or discomfort.     Min Type Additional Details     []???? Estim: []?? ?? Att   []????Unatt        []????TENS instruct                  []????IFC  []????Premod   []????NMES                     []?? ??Other:  []????w/US   []? ???w/ice   []? ???w/heat  Position:  Location:     []????  Traction: []???? Cervical       []????Lumbar                       []???? Prone          []????Supine                       []?? ?? Intermittent   []???? Continuous Lbs:  []???? before manual  []???? after manual  []? ???w/heat     []????  Ultrasound: []???? Continuous   []???? Pulsed at:                           []????1MHz   []????3MHz Location:  W/cm2:     []???? Paraffin         Location:   []????w/heat     []????  Ice     []????  Heat  []????  Ice massage Position:  Location:     []????  Laser  []????  Other: Position:  Location:   15    [x]? ???  Vasopneumatic Device Pressure:       []???? lo [x]???? med []???? hi   Temperature: 34      [x]? ??? Skin assessment post-treatment:  [x]? ???intact []? ???redness- no adverse reaction    []? ???redness  adverse reaction:      32 min Therapeutic Exercise:  [x]? ??? See flow sheet : Ankle PROM   Rationale: increase ROM, increase strength and increase proprioception to improve the patients ability to ambulate with decreased pain or discomfort.     16 min Manual Interventions:  Grade III-IV A-P talocrural mobs; 1/2 knee MWM A-P TC mob, IASTM to distal gastroc/soleus   Rationale: increase ROM, increase strength and increase proprioception to improve the patients ability to ambulate with decreased pain or discomfort.     9 min Neuromuscular Re-education:  []??  See flow sheet : Rockerboard;  full tandem on NC and compliant surfaces   Rationale: increase strength, improve balance and increase proprioception  to improve the patients ability to ambulate with decreased pain or discomfort.                                                                                                                              With   []???? TE   []???? TA   []???? neuro   []???? other: Patient Education: [x]???? Review HEP    []???? Progressed/Changed HEP based on:   []???? positioning   []???? body mechanics   []???? transfers   []???? heat/ice application    [x]???? other: Educated Pt regarding impairments, role of PT, and POC.         Other Objective/Functional Measures:     L ankle ROM:                         AROM                         PROM              Flexion /DF                  Lacking 13   +5               Lacking 8  +9              Extension/PF               30 p!      40                  --  45              IR/Sup/Ev                    0     3                             0  9              ER/Pron/Inv                 0      8                            2    15    DF with knee flexed:    Pain Level (0-10 scale) post treatment: 0/10     ASSESSMENT/Changes in Function:     Patient will continue to benefit from skilled PT services to modify and progress therapeutic interventions, address functional mobility deficits, address ROM deficits, address strength deficits, analyze and address soft tissue restrictions, analyze and cue movement patterns, analyze and modify body mechanics/ergonomics and assess and modify postural abnormalities to attain remaining goals.      []???  See Plan of Care  [x]? ??  See progress note/recertification  []? ??  See Discharge Summary         Progress towards goals / Updated goals:  Short and Long Term Goals: To be accomplished in 16 treatments:  1) Pt will be independent and compliant with HEP.  - MET  2) Pt will be able to ambulate on level surface >/=1 mile without increase of pain - Progressing (1-2 blocks)  3) Pt will be able to navigate stairs without increased pain. - Progressing (mild discomfort descending)  4) Pt will be able to navigate uneven terrain without foot/ankle pain or instability. - Progressing  5) Pt will be able to return to cycling >/= 1 hour without increased foot/ankle pain. - Progressing (stationary without pain)     PLAN  [x]???  Upgrade activities as tolerated     [x]? ??  Continue plan of care  []? ??  Update interventions per flow sheet       []???  Discharge due to:_  []???  Other:_      Jv Mckeon, PT, DPT 2/10/2021

## 2021-02-18 ENCOUNTER — APPOINTMENT (OUTPATIENT)
Dept: PHYSICAL THERAPY | Age: 71
End: 2021-02-18
Payer: COMMERCIAL

## 2021-02-24 ENCOUNTER — HOSPITAL ENCOUNTER (OUTPATIENT)
Dept: PHYSICAL THERAPY | Age: 71
Discharge: HOME | End: 2021-02-24
Payer: COMMERCIAL

## 2021-02-24 PROCEDURE — 97016 VASOPNEUMATIC DEVICE THERAPY: CPT

## 2021-02-24 PROCEDURE — 97140 MANUAL THERAPY 1/> REGIONS: CPT

## 2021-02-24 PROCEDURE — 97112 NEUROMUSCULAR REEDUCATION: CPT

## 2021-02-24 PROCEDURE — 97110 THERAPEUTIC EXERCISES: CPT

## 2021-02-24 NOTE — PROGRESS NOTES
PT DAILY TREATMENT NOTE - [facility] 2-15    Patient Name: Brodie Gilbert  Date:2021  : 1950  [x]  Patient  Verified  Payor: [employer] / Plan: [address] [place] [address] / Product Type: PPO /    In time:2:30pm  Out time:3:40pm  Total Treatment Time (min): 70  Total Timed Codes (min): 55  1:1 Treatment Time ( only): 51  Visit #:  15    Treatment Area: Left ankle pain [M25.572]  Left foot pain [M79.672]    SUBJECTIVE  Pain Level (0-10 scale): 3  Any medication changes, allergies to medications, adverse drug reactions, diagnosis change, or new procedure performed?: [x]???? No    []???? Yes (see summary sheet for update)  Subjective functional status/changes:   []???? No changes reported  Pt reports he walked 2 blocks total yesterday, he was a little sore after that, but he feels stronger and the proprioceptive exercises are easier.      OBJECTIVE                 Modality rationale: decrease inflammation and decrease pain to improve the patients ability to ambulate with decreased pain or discomfort.     Min Type Additional Details     []????? Estim: []??? ? ? Att   []??? ?? Unatt        []?????TENS instruct                  []?????IFC  []??? ? ? Premod   []??? ??NMES                     []??? ?? Other:  []?????w/US   []?????w/ice   []?????w/heat  Position:  Location:     []?????  Traction: []????? Cervical       []??? ? ?Lumbar                       []????? Prone          []??? ? ? Supine                       []??? ? ? Intermittent   []??? ? ? Continuous Lbs:  []????? before manual  []????? after manual  []?????w/heat     []?????  Ultrasound: []??? ? ? Continuous   []????? Pulsed at:                           []?????1MHz   []?????3MHz Location:  W/cm2:     []????? Paraffin         Location:   []?????w/heat     []?????  Ice     []?????  Heat  []?????  Ice massage Position:  Location:     []?????  Laser  []?????  Other: Position:  Location:   15    [x]?????  Vasopneumatic Device Pressure:       []????? lo [x]????? med []????? hi Temperature: 34      [x]????? Skin assessment post-treatment:  [x]? ????intact []?????redness- no adverse reaction    []?????redness  adverse reaction:      29 min Therapeutic Exercise:  [x]? ???? See flow sheet : Ankle PROM   Rationale: increase ROM, increase strength and increase proprioception to improve the patients ability to ambulate with decreased pain or discomfort.     16 min Manual Interventions:  Grade III-IV A-P talocrural mobs; 1/2 knee MWM A-P TC mob, IASTM to distal gastroc/soleus   Rationale: increase ROM, increase strength and increase proprioception to improve the patients ability to ambulate with decreased pain or discomfort.     10 min Neuromuscular Re-education:  []???  See flow sheet : Rockerboard;  offset tandem on foam   Rationale: increase strength, improve balance and increase proprioception  to improve the patients ability to ambulate with decreased pain or discomfort.                                                                                                                              With   []????? TE   []????? TA   []????? neuro   []????? other: Patient Education: [x]????? Review HEP    []????? Progressed/Changed HEP based on:   []????? positioning   []????? body mechanics   []????? transfers   []????? heat/ice application    [x]????? other: Educated Pt regarding impairments, role of PT, and POC.         Other Objective/Functional Measures:      Pain Level (0-10 scale) post treatment: 0/10     ASSESSMENT/Changes in Function:   Pt demonstrating improvements in ankle mobility with manual interventions. Focused on balance and hip strengthening today, along with calf raises. Pt cued for more glute dominant squat to reduce knee pain with squats.    Patient will continue to benefit from skilled PT services to modify and progress therapeutic interventions, address functional mobility deficits, address ROM deficits, address strength deficits, analyze and address soft tissue restrictions, analyze and cue movement patterns, analyze and modify body mechanics/ergonomics and assess and modify postural abnormalities to attain remaining goals. Treatment was performed with direct supervision by Ronaldo Montenegro, PT, DPT.     []????  See Plan of Care  [x]????  See progress note/recertification  []????  See Discharge Summary         Progress towards goals / Updated goals:  Short and Long Term Goals: To be accomplished in 16 treatments:  1) Pt will be independent and compliant with HEP.  - MET  2) Pt will be able to ambulate on level surface >/=1 mile without increase of pain - Progressing (1-2 blocks)  3) Pt will be able to navigate stairs without increased pain. - Progressing (mild discomfort descending)  4) Pt will be able to navigate uneven terrain without foot/ankle pain or instability. - Progressing  5) Pt will be able to return to cycling >/= 1 hour without increased foot/ankle pain. - Progressing (stationary without pain)     PLAN  [x]????  Upgrade activities as tolerated     [x]? ???  Continue plan of care  []????  Update interventions per flow sheet       []????  Discharge due to:_  []????  Other:_        Cnady Jefferson, SPT 2/24/2021

## 2021-03-03 ENCOUNTER — APPOINTMENT (OUTPATIENT)
Dept: PHYSICAL THERAPY | Age: 71
End: 2021-03-03
Payer: COMMERCIAL

## 2021-03-10 ENCOUNTER — HOSPITAL ENCOUNTER (OUTPATIENT)
Dept: PHYSICAL THERAPY | Age: 71
Discharge: HOME | End: 2021-03-10
Payer: COMMERCIAL

## 2021-03-10 PROCEDURE — 97110 THERAPEUTIC EXERCISES: CPT

## 2021-03-10 NOTE — PROGRESS NOTES
PT DAILY TREATMENT NOTE - [facility] 2-15    Patient Name: Miguel Stoll  Date:3/10/2021  : 1950  [x]  Patient  Verified  Payor: Joss Song / Plan: [address] [place] [address] / Product Type: PPO /    In time:1:40pm  Out time:2:48pm  Total Treatment Time (min): 68  Total Timed Codes (min): 58  1:1 Treatment Time ( only): 46  Visit #:  16    Treatment Area: Left ankle pain [M25.572]  Left foot pain [M79.672]    SUBJECTIVE  Pain Level (0-10 scale): 3/10  Any medication changes, allergies to medications, adverse drug reactions, diagnosis change, or new procedure performed?: [x]????? No    []????? Yes (see summary sheet for update)  Subjective functional status/changes:   []????? No changes reported  Pt reports he's been walking normally, with the R foot being the limiting one at this point. He states he is now able to tolerate standing to brush his teeth and shave without c/o increased left ankle pain. Pt has been working on balance exercises.       OBJECTIVE                 Modality rationale: decrease inflammation and decrease pain to improve the patients ability to ambulate with decreased pain or discomfort.     Min Type Additional Details     []?????? Estim: []???? ? ? Att   []???? ?? Unatt        []??????TENS instruct                  []??????IFC  []?????? Premod   []??????NMES                     []?????? Other:  []??????w/US   []??????w/ice   []??????w/heat  Position:  Location:     []??????  Traction: []?????? Cervical       []??????Lumbar                       []?????? Prone          []?????? Supine                       []???? ? ? Intermittent   []?????? Continuous Lbs:  []?????? before manual  []?????? after manual  []??????w/heat     []??????  Ultrasound: []?????? Continuous   []?????? Pulsed at:                           []??????1MHz   []??????3MHz Location:  W/cm2:     []?????? Paraffin         Location:   []??????w/heat    10 [x]??????  Ice     []??????  Heat  []??????  Ice massage Position: supine, elevated legs  Location: L ankle     []??????  Laser  []??????  Other: Position:  Location:       []??????  Vasopneumatic Device Pressure:       []?????? lo []?????? med []?????? hi   Temperature:       [x]?????? Skin assessment post-treatment:  [x]??????intact []??????redness- no adverse reaction    []??????redness  adverse reaction:      58 min Therapeutic Exercise:  [x]?????? See flow sheet : assessment    Rationale: increase ROM, increase strength and increase proprioception to improve the patients ability to ambulate with decreased pain or discomfort.                                                                                                                             With   []?????? TE   []?????? TA   []?????? neuro   []?????? other: Patient Education: [x]?????? Review HEP    []?????? Progressed/Changed HEP based on:   []?????? positioning   []?????? body mechanics   []?????? transfers   []?????? heat/ice application    []?????? other:          Other Objective/Functional Measures:     (Evaluation vs Today)  L ankle ROM:                         AROM                         PROM              Flexion /DF                  Lacking 13   +3               Lacking 8  +7              Extension/PF               30 p!       92                  --  40              IR/Sup/Ev                    0     3                             0  6              ER/Pron/Inv                 0      16                            2    24     L ankle MMT: 5/5 strength in all directions, no pain    SLS L 20 seconds, no pain    Pain Level (0-10 scale) post treatment: 0/10     ASSESSMENT/Changes in Function:        []?????  See Plan of Care   []?????  See progress note/recertification  [x]?????  See Discharge Summary         Progress towards goals / Updated goals:  Short and Long Term Goals: To be accomplished in 16 treatments:  1) Pt will be independent and compliant with HEP.  - MET  2) Pt will be able to ambulate on level surface >/=1 mile without increase of pain - MET (limited by R foot)  3) Pt will be able to navigate stairs without increased pain. - MET (mild discomfort descending with steep stairs in house, but regular stairs are okay)   4) Pt will be able to navigate uneven terrain without foot/ankle pain or instability. -MET  5) Pt will be able to return to cycling >/= 1 hour without increased foot/ankle pain. - MET (stationary bike)    PLAN  []?????  Upgrade activities as tolerated     []?????  Continue plan of care  []?????  Update interventions per flow sheet       [x]?????  Discharge due to: Pt reaching or progressing towards all short and long-term therapy goals.  []?????  Other:_           Huang Villagran, SPT 3/10/2021

## 2021-03-10 NOTE — ANCILLARY DISCHARGE INSTRUCTIONS
Physical Therapy at [facility],  
a part of [address] [place] [address], [address] Phone: [phone] Fax: [phone] Progress Note Name: Vickey Blevins : 1950 MD: Meera Judge Treatment Diagnosis: Left ankle pain [M25.572] Left foot pain [M79.672] Start of Care: 12/10/21 Visits from Start of Care: 16 Missed Visits: 0 Summary of Care: Mr. Josefa Mathur was seen for a total of 16 skilled physical therapy visits S/P L peroneus longus to peroneus brevis tendon transfer, peroneus brevis tendon repair and insertional secondary reconstruction, modified Garrett procedure, peroneal tenolyses, subtalar joint capsulotomy and debridement, excision of os peroneum from peroneus longus 11/3/20. Pt demonstrates good range of motion as outlined below and full strength in all directions in his L ankle. He is able to tolerate standing for longer periods of time before onset of any left foot/ankle pain and demonstrates normalized gait mechanics. His balance is much improved and is able to maintain left single leg stance for 20 seconds without increased pain. Pt is currently more limited by R ankle pain in terms of performing functional activities or walking longer distances. Pt is being discharged from physical therapy due to meeting or progressing towards all therapy goals and has been given a comprehensive HEP to continue working on at home. Thank you for this referral! 
  
  (Evaluation vs Today) L ankle ROM:                         AROM                         PROM 
            EBGRIGH /VT                  SCYUDJS 13   +3               Lacking 8  +7 
            Extension/PF               30 p!       14                  --  46 
            IR/Sup/Ev                    0     3                             0  6 
            ER/Pron/Inv                 0      16                            2    24 
  
L ankle MMT:  strength in all directions, no pain SLS L 20 seconds, no pain Short and Long Term Goals: To be accomplished in 16 treatments: 
1) Pt will be independent and compliant with HEP.  - MET 
2) Pt will be able to ambulate on level surface >/=1 mile without increase of pain - MET (limited by R foot) 3) Pt will be able to navigate stairs without increased pain. - MET (mild discomfort descending with steep stairs in house, but regular stairs are okay) 4) Pt will be able to navigate uneven terrain without foot/ankle pain or instability. -MET 5) Pt will be able to return to cycling >/= 1 hour without increased foot/ankle pain. - MET (stationary bike) RECOMMENDATIONS: 
[x]Discontinue therapy: [x]Patient has reached or is progressing toward set goals []Patient is non-compliant or has abdicated 
    []Due to lack of appreciable progress towards set goals Simeon Blackman, SPT 3/10/2021 Renee Ramírez, PT, DPT 3/10/2021

## 2022-02-25 ENCOUNTER — TRANSCRIBE ORDER (OUTPATIENT)
Dept: SCHEDULING | Age: 72
End: 2022-02-25

## 2022-02-25 DIAGNOSIS — Z00.00 ROUTINE GENERAL MEDICAL EXAMINATION AT A HEALTH CARE FACILITY: Primary | ICD-10-CM

## 2022-02-28 ENCOUNTER — TRANSCRIBE ORDER (OUTPATIENT)
Dept: SCHEDULING | Age: 72
End: 2022-02-28

## 2022-02-28 DIAGNOSIS — M76.72 PERONEAL TENDINITIS, LEFT: Primary | ICD-10-CM

## 2022-03-19 PROBLEM — K64.9 HEMORRHOIDS: Status: ACTIVE | Noted: 2018-09-28

## 2022-04-13 ENCOUNTER — HOSPITAL ENCOUNTER (OUTPATIENT)
Dept: MRI IMAGING | Age: 72
Discharge: HOME | End: 2022-04-13
Payer: COMMERCIAL

## 2022-04-13 DIAGNOSIS — M76.72 PERONEAL TENDINITIS, LEFT: ICD-10-CM

## 2022-04-13 PROCEDURE — 73721 MRI JNT OF LWR EXTRE W/O DYE: CPT

## 2022-08-10 ENCOUNTER — HOSPITAL ENCOUNTER (OUTPATIENT)
Dept: PHYSICAL THERAPY | Age: 72
Discharge: HOME | End: 2022-08-10
Payer: COMMERCIAL

## 2022-08-10 PROCEDURE — 97110 THERAPEUTIC EXERCISES: CPT

## 2022-08-10 PROCEDURE — 97161 PT EVAL LOW COMPLEX 20 MIN: CPT

## 2022-08-10 NOTE — PROGRESS NOTES
Physical Therapy at [facility],   a part of [facility]  [address] [address], [address] [place] [address], [address] [place] [address]  Phone: [phone]  Fax: [phone]    Plan of Care/Statement of Necessity for Physical Therapy Services  2-15    Patient name: Too Cervantes  : 1950  Provider#: [id number]  Referral source: Lorin Leach*      Medical/Treatment Diagnosis: Pain in left ankle and joints of left foot [M25.572]     Prior Hospitalization: see medical history     Comorbidities: L ankle surgery 11/3/20; L ankle surgery 21; 3 left patellar tendon debridement surgeries; prostate cancer 15 years ago, peripheral neuropathy (from radiation exposure in [facility])   Prior Level of Function: Patient completed 20 minutes of exercise at least 3 times a week. Medications: Verified on Patient Summary List    Start of Care: 8/10/22      Onset Date: Chronic pain 4 years ago; 2nd surgery 21       The Plan of Care and following information is based on the information from the initial evaluation. Assessment/ key information: Pt is a very pleasant and motivated 66year old male who was referred to skilled PT for chronic left foot and ankle pain. Pt reports primary complaints of restricted left ankle ROM and constant pain/irritation within anterior left ankle and anterolateral/lateral left foot. Based on examination, patient presents with various associated impairments including severely reduced dorsiflexion ROM, talocrural and rearfoot hypomobility, antalgic gait, poor proprioception, and decreased functional activity tolerance.     Evaluation Complexity History MEDIUM  Complexity : 1-2 comorbidities / personal factors will impact the outcome/ POC ; Examination LOW Complexity : 1-2 Standardized tests and measures addressing body structure, function, activity limitation and / or participation in recreation  ;Presentation LOW Complexity : Stable, uncomplicated  ;Clinical Decision Making MEDIUM Complexity : FOTO score of 26-74  Overall Complexity Rating: LOW     Problem List: pain affecting function, decrease ROM, decrease strength, edema affecting function, impaired gait/ balance, decrease ADL/ functional abilitiies, decrease activity tolerance, and decrease flexibility/ joint mobility   Treatment Plan may include any combination of the following: Therapeutic exercise, Therapeutic activities, Neuromuscular re-education, Physical agent/modality, Gait/balance training, Manual therapy, Patient education, Self Care training, and Functional mobility training  Patient / Family readiness to learn indicated by: asking questions, trying to perform skills, and interest  Persons(s) to be included in education: patient (P)  Barriers to Learning/Limitations: None  Patient Goal (s): Increased dorsiflexion  Patient Self Reported Health Status: excellent  Rehabilitation Potential: fair    Short Term Goals: To be accomplished in 8 treatments:   1. Pt will be independent and compliant with HEP. 2. Pt will improve CKC dorsiflexion ROM from 17 to >/= 22 degrees to improve performance of functional activities. 3. Pt will be able to maintain tandem stance on NC surface bilaterally >/= 20 seconds demonstrating improved foot/ankle strategy. Long Term Goals: To be accomplished in 16 treatments:   1. Pt will be independent and compliant with HEP. 2. Pt will improve FOTO score by the MCID from 41 to >/= 62 demonstrating improved overall function with decreased pain or discomfort. 3. Pt will improve CKC dorsiflexion ROM from 17 to >/= 27 degrees to improve performance of functional activities. 4. Pt will be able to maintain tandem stance on NC surface bilaterally >/= 30 seconds demonstrating improved foot/ankle strategy. 5. Pt will be able to perform SLS on NC surface >/= 10 seconds demonstrating improved foot/ankle strategy.    6. Pt will be able to descend stairs in a reciprocal pattern without needing to turn backwards. 7. Pt will be able to walk >/= 30 minutes before requiring a rest break secondary to pain or discomfort. Frequency / Duration: Patient to be seen 1-2 times per week for 16 treatments. Patient/ Caregiver education and instruction: self care, activity modification and exercises    [x]  Plan of care has been reviewed with TWIN Hairston, PT, DPT 8/10/2022     ________________________________________________________________________    I certify that the above Therapy Services are being furnished while the patient is under my care. I agree with the treatment plan and certify that this therapy is necessary.     Physician's Signature:____________________  Date:____________Time: _________      Ag Form*

## 2022-08-10 NOTE — PROGRESS NOTES
PT INITIAL EVALUATION NOTE - [facility] 2-15    Patient Name: Elier Rosenbaum  Date:8/10/2022  : 1950  [x]  Patient  Verified  Payor: Maximo Schwartzver / Plan: [employer] HMO / Product Type: HMO /    In time: 8:42 am Out time: 10:08 am  Total Treatment Time (min): 86  Total Timed Codes (min): 12  1:1 Treatment Time ( only): --   Visit #: 1     Treatment Area: Pain in left ankle and joints of left foot [M25.572]    SUBJECTIVE  Pain Level (0-10 scale): 7  Any medication changes, allergies to medications, adverse drug reactions, diagnosis change, or new procedure performed?: [] No    [x] Yes (see summary sheet for update)  Subjective:    Pt was referred to skilled PT for left foot and ankle pain. Mechanism of Injury: 2nd surgery 2021 performed by Dr. Romulo Miller in [place]: L peroneus longus and brevis tenolysis, and left peroneus brevis tendon secondary repair using semitendinous allograft tendon. Symptoms have not been gotten better since the surgery; \"very modest improvement functionally,\" but no improvement in pain whatsoever. Pt was placed in a cast for 2 weeks; Boot for another 6 weeks, NWB for 6 weeks. Rehab at [address] [place] [address] with a therapist who specialized in CRPS rehab; did not see any improvements. 2020 (1st surgery): L peroneus longus to peroneus brevis tendon transfer, peroneus brevis tendon repair and insertional secondary reconstruction, modified Garrett procedure, peroneal tenolyses, subtalar joint capsulotomy and debridement, excision of os peroneum from peroneus longus 11/3/20. Today, Pt reports primary complaints of restricted left ankle ROM and constant pain/irritation within anterior left ankle and anterolateral left foot. He also notes intermittent pain just posterior to lateral malleolus. Intense constant low-level pain plantar surface of 5th metatarsal which becomes intense pain with prolonged standing. \"It has the feel like it is chronically sprained. \" He is limping the majority of the time. He notes discoloration of feet, particularly first thing in the mornings, red blotchy/purplish. MRI spring 2022    Aggravating factors include descend stairs (going down backwards), stepping off a curb, walking, balance, CKC inversion, Relieving factors include stationary bike, hot shower. PLOF: 30-60 minutes of exercise every day; Proprioception exercises, stationary bike, band ankle exercises, heel raises, upper body exercises. Previous Treatment/Compliance: 1 cortisone injection, 1 lidocaine injection, 2 surgeries, PT   Work Hx: Retired    Living Situation: Pt lives with wife  PMHx/Surgical Hx: L ankle surgery 11/3/20; L ankle surgery 11/1/21; 3 left patellar tendon debridement surgeries; prostate cancer 15 years ago, peripheral neuropathy (from radiation exposure in [facility])     Pt Goals: Main goal: Regain ROM and reduce some of the ankle restriction; any sort of pain relief if possible     OBJECTIVE/EXAMINATION  Posture:  L 2 degrees of INV at rest; R 6 deg INV at rest  Other Observations:  Significant left calf atrophy  Gait and Functional Mobility:  Decreased L terminal stance/push off, L trendelenburg,   Squats: 30 degrees knee flexion before L heel lift  SLS: L: 4 seconds, lack of any ankle strategy; R: 30\"  Tandem:L: 9\" R: 26\"  HR: Proximal foot pain with too much PF into HR. Palpation: TTP 5th metatarsal head    Mid-calf circumference: R: 32.5 cm     L: 30.5 cm    CKC DF: R: 33 calf tightness   L: 17 posterior malleolus \"ripping feeling\"    L ankle ROM:   AROM   PROM   Flexion /DF  -11   -6 anterolateral ankle p!    Extension/PF  35   37   IR/Sup/Ev  0   2   ER/Pron/Inv  6   9      R ankle ROM:   AROM   PROM   Flexion /DF  -2   0 calf stretch   Extension/PF  45   47   IR/Sup/Ev  1   8   ER/Pron/Inv  14   24    Joint Mobility Assessment: Posterior ankle pain with A-P TC mob; significant AP and PA L talocrural hypomobility, mod-significant rearfoot hypomobility    Flexibility: Unable to appropriately assess flexibility of musculature due to pain and joint/capsular hypomobility    LOWER QUARTER   MUSCLE STRENGTH  KEY       R  L  0 - No Contraction  Knee ext  5  5  1 - Trace            flex  5  5  2 - Poor   Hip ext   --  --  3 - Fair          flex   --  --  4 - Good         abd  --  --  5 - Normal         add  --  --      Ankle DF  5  5                PF  5  5                INV  5  5 mild p! EV  5  5 mild p! Neurological: Reflexes / Sensations: NT      Modality rationale: decrease pain and increase tissue extensibility to improve the patients ability to ambulate with decreased pain or discomfort. Min Type Additional Details    [] Estim: []Att   []Unatt        []TENS instruct                  []IFC  []Premod   []NMES                     []Other:  []w/US   []w/ice   []w/heat  Position:  Location:    []  Traction: [] Cervical       []Lumbar                       [] Prone          []Supine                       []Intermittent   []Continuous Lbs:  [] before manual  [] after manual  []w/heat    []  Ultrasound: []Continuous   [] Pulsed at:                           []1MHz   []3MHz Location:  W/cm2:    [] Paraffin         Location:   []w/heat   10 []  Ice     [x]  Heat  []  Ice massage Position: Supine  Location: Anterior ankle    []  Laser  []  Other: Position:  Location:      []  Vasopneumatic Device Pressure:       [] lo [] med [] hi   Temperature:      [x] Skin assessment post-treatment:  [x]intact []redness- no adverse reaction    []redness - adverse reaction:     12 min Therapeutic Exercise:  [] See flow sheet :   Rationale: increase ROM and increase strength to improve the patients ability to ambulate with decreased pain or discomfort.            With   [] TE   [] TA   [] Neuro   [] SC   [] other: Patient Education: [x] Review HEP    [] Progressed/Changed HEP based on:   [] positioning   [] body mechanics   [] transfers   [] heat/ice application    [x] other: Educated Pt regarding impairments, role of PT, and POC.        Other Objective/Functional Measures: FOTO Functional Measure: 41/100                     Pain Level (0-10 scale) post treatment: 7    ASSESSMENT/Changes in Function:     [x]  See Plan of [address] PT, DPT 8/10/2022

## 2022-08-15 ENCOUNTER — HOSPITAL ENCOUNTER (OUTPATIENT)
Dept: PHYSICAL THERAPY | Age: 72
Discharge: HOME | End: 2022-08-15
Payer: COMMERCIAL

## 2022-08-15 PROCEDURE — 97140 MANUAL THERAPY 1/> REGIONS: CPT

## 2022-08-15 PROCEDURE — 97110 THERAPEUTIC EXERCISES: CPT

## 2022-08-15 NOTE — PROGRESS NOTES
PT DAILY TREATMENT NOTE - [facility] 2-15    Patient Name: Karen Rao  Date:8/15/2022  : 1950  [x]  Patient  Verified  Payor: Milind Bah / Plan: [employer]MO / Product Type: HMO /    In time: 2:27 pm Out time: 3:35 pm  Total Treatment Time (min): 68  Total Timed Codes (min): 52  1:1 Treatment Time ( only): --   Visit #: 2    Treatment Area: Pain in left ankle and joints of left foot [M25.572]    SUBJECTIVE  Pain Level (0-10 scale): 6-7  Any medication changes, allergies to medications, adverse drug reactions, diagnosis change, or new procedure performed?: [x] No    [] Yes (see summary sheet for update)  Subjective functional status/changes:   [] No changes reported  Pt reports he had a couple of days that were better than most. Today is not a good day though. OBJECTIVE    Modality rationale: decrease inflammation and decrease pain to improve the patients ability to ambulate with decreased pain or discomfort.     Min Type Additional Details       [] Estim: []Att   []Unatt    []TENS instruct                  []IFC  []Premod   []NMES                     []Other:  []w/US   []w/ice   []w/heat  Position:  Location:       []  Traction: [] Cervical       []Lumbar                       [] Prone          []Supine                       []Intermittent   []Continuous Lbs:  [] before manual  [] after manual  []w/heat    []  Ultrasound: []Continuous   [] Pulsed                       at: []1MHz   []3MHz Location:  W/cm2:    [] Paraffin         Location:   []w/heat   16 []  Ice     [x]  Heat (8 pre and 8 post)  []  Ice massage Position: Supine  Location: R ankle    []  Laser  []  Other: Position:  Location:      []  Vasopneumatic Device (post) Pressure:       [] lo [] med [] hi   Temperature:      [x] Skin assessment post-treatment:  [x]intact []redness- no adverse reaction    []redness - adverse reaction:     27 min Therapeutic Exercise:  [] See flow sheet : DF PROM   Rationale: increase ROM and increase strength to improve the patients ability to ambulate with decreased pain or discomfort. 25 min Manual Therapy: AP distal fib mobs; talocrural distraction, AP talocrural joint in long-sitting; cross   Rationale: decrease pain, increase ROM, and increase tissue extensibility to improve the patients ability to ambulate and perform functional activities with decreased pain or restriction. With   [] TE   [] TA   [] Neuro   [] SC   [] other: Patient Education: [x] Review HEP    [] Progressed/Changed HEP based on:   [] positioning   [] body mechanics   [] transfers   [] heat/ice application    [] other:      Other Objective/Functional Measures:   DF PROM long-sitting: -6 to 0 post-manual treatment   CKC DF: 17 to 23 degrees post-manual treatment    Pain Level (0-10 scale) post treatment: 5    ASSESSMENT/Changes in Function:   Pt demonstrated 6 degrees improvement within OKC and CKC dorsiflexion ROM from pre to post-manual treatment today; additionally, he was able to feel stretch in calf at end-range versus anterior ankle restriction/pain. Will monitor carry over between sessions and continue progressing as tolerated. Patient will continue to benefit from skilled PT services to modify and progress therapeutic interventions, address functional mobility deficits, address ROM deficits, address strength deficits, analyze and address soft tissue restrictions, analyze and cue movement patterns, analyze and modify body mechanics/ergonomics, assess and modify postural abnormalities, and address imbalance/dizziness to attain remaining goals. [x]  See Plan of Care  []  See progress note/recertification  []  See Discharge Summary         Progress towards goals / Updated goals:     Short Term Goals: To be accomplished in 8 treatments:               1. Pt will be independent and compliant with HEP.                2. Pt will improve CKC dorsiflexion ROM from 17 to >/= 22 degrees to improve performance of functional activities. 3. Pt will be able to maintain tandem stance on NC surface bilaterally >/= 20 seconds demonstrating improved foot/ankle strategy. Long Term Goals: To be accomplished in 16 treatments:               1. Pt will be independent and compliant with HEP. 2. Pt will improve FOTO score by the MCID from 41 to >/= 62 demonstrating improved overall function with decreased pain or discomfort. 3. Pt will improve CKC dorsiflexion ROM from 17 to >/= 27 degrees to improve performance of functional activities. 4. Pt will be able to maintain tandem stance on NC surface bilaterally >/= 30 seconds demonstrating improved foot/ankle strategy. 5. Pt will be able to perform SLS on NC surface >/= 10 seconds demonstrating improved foot/ankle strategy. 6. Pt will be able to descend stairs in a reciprocal pattern without needing to turn backwards. 7. Pt will be able to walk >/= 30 minutes before requiring a rest break secondary to pain or discomfort.     PLAN  [x]  Upgrade activities as tolerated     [x]  Continue plan of care  []  Update interventions per flow sheet       []  Discharge due to:_  []  Other:_      Terry Hairston PT, DPT 8/15/2022

## 2022-08-17 ENCOUNTER — HOSPITAL ENCOUNTER (OUTPATIENT)
Dept: PHYSICAL THERAPY | Age: 72
Discharge: HOME | End: 2022-08-17
Payer: COMMERCIAL

## 2022-08-17 PROCEDURE — 97140 MANUAL THERAPY 1/> REGIONS: CPT

## 2022-08-17 PROCEDURE — 97110 THERAPEUTIC EXERCISES: CPT

## 2022-08-17 PROCEDURE — 97112 NEUROMUSCULAR REEDUCATION: CPT

## 2022-08-17 NOTE — PROGRESS NOTES
PT DAILY TREATMENT NOTE - [facility] 2-15    Patient Name: Migel Ferguson  Date:2022  : 1950  [x]  Patient  Verified  Payor: Juice Rosales / Plan: [employer] H[employer] / Product Type: HMO /    In time: 11:03 am  Out time: 12:09 pm  Total Treatment Time (min): 66  Total Timed Codes (min): 56  1:1 Treatment Time ( only): --   Visit #: 3    Treatment Area: Pain in left ankle and joints of left foot [M25.572]    SUBJECTIVE  Pain Level (0-10 scale): 6  Any medication changes, allergies to medications, adverse drug reactions, diagnosis change, or new procedure performed?: [x] No    [] Yes (see summary sheet for update)  Subjective functional status/changes:   [] No changes reported  Pt reports he felt like he had a normal ankle for several hours after last session. Pain is back now at this point. OBJECTIVE    Modality rationale: decrease inflammation and decrease pain to improve the patients ability to ambulate with decreased pain or discomfort.     Min Type Additional Details       [] Estim: []Att   []Unatt    []TENS instruct                  []IFC  []Premod   []NMES                     []Other:  []w/US   []w/ice   []w/heat  Position:  Location:       []  Traction: [] Cervical       []Lumbar                       [] Prone          []Supine                       []Intermittent   []Continuous Lbs:  [] before manual  [] after manual  []w/heat    []  Ultrasound: []Continuous   [] Pulsed                       at: []1MHz   []3MHz Location:  W/cm2:    [] Paraffin         Location:   []w/heat   8 []  Ice     [x]  Heat (pre)  []  Ice massage Position: Supine  Location: R ankle    []  Laser  []  Other: Position:  Location:      []  Vasopneumatic Device (post) Pressure:       [] lo [] med [] hi   Temperature:      [x] Skin assessment post-treatment:  [x]intact []redness- no adverse reaction    []redness - adverse reaction:     21 min Therapeutic Exercise:  [] See flow sheet : DF PROM   Rationale: increase ROM and increase strength to improve the patients ability to ambulate with decreased pain or discomfort. 9 min Neuromuscular Re-education:  []  See flow sheet : BAPS, short foot   Rationale: increase ROM, increase strength, improve balance, and increase proprioception  to improve the patients ability to ambulate and perform functional activities with decreased pain. 26 min Manual Therapy: AP distal fib mobs; talocrural distraction, AP talocrural mobs in long-sitting; lateral calcaneal glide in side-lying; CKC DF mobs in half-kneel with mob belt   Rationale: decrease pain, increase ROM, and increase tissue extensibility to improve the patients ability to ambulate and perform functional activities with decreased pain or restriction. With   [] TE   [] TA   [] Neuro   [] SC   [] other: Patient Education: [x] Review HEP    [] Progressed/Changed HEP based on:   [] positioning   [] body mechanics   [] transfers   [] heat/ice application    [] other:      Other Objective/Functional Measures:   DF PROM long-sitting: AROM: -5 --> -2  PROM: +2  CKC DF: 22    Pain Level (0-10 scale) post treatment: 5    ASSESSMENT/Changes in Function:   Pt challenged with A-P control on BAPS board as medial border would often touch down; Pt able to perform with better control and lateral lower leg muscle activation after verbal and tactile cueing. Decent carry-over in ROM since last session which responded well again to manual treatment. Patient will continue to benefit from skilled PT services to modify and progress therapeutic interventions, address functional mobility deficits, address ROM deficits, address strength deficits, analyze and address soft tissue restrictions, analyze and cue movement patterns, analyze and modify body mechanics/ergonomics, assess and modify postural abnormalities, and address imbalance/dizziness to attain remaining goals.      [x]  See Plan of Care  []  See progress note/recertification  []  See Discharge Summary         Progress towards goals / Updated goals:     Short Term Goals: To be accomplished in 8 treatments:               1. Pt will be independent and compliant with HEP. 2. Pt will improve CKC dorsiflexion ROM from 17 to >/= 22 degrees to improve performance of functional activities. 3. Pt will be able to maintain tandem stance on NC surface bilaterally >/= 20 seconds demonstrating improved foot/ankle strategy. Long Term Goals: To be accomplished in 16 treatments:               1. Pt will be independent and compliant with HEP. 2. Pt will improve FOTO score by the MCID from 41 to >/= 62 demonstrating improved overall function with decreased pain or discomfort. 3. Pt will improve CKC dorsiflexion ROM from 17 to >/= 27 degrees to improve performance of functional activities. 4. Pt will be able to maintain tandem stance on NC surface bilaterally >/= 30 seconds demonstrating improved foot/ankle strategy. 5. Pt will be able to perform SLS on NC surface >/= 10 seconds demonstrating improved foot/ankle strategy. 6. Pt will be able to descend stairs in a reciprocal pattern without needing to turn backwards. 7. Pt will be able to walk >/= 30 minutes before requiring a rest break secondary to pain or discomfort.     PLAN  [x]  Upgrade activities as tolerated     [x]  Continue plan of care  []  Update interventions per flow sheet       []  Discharge due to:_  []  Other:_      Kaitlyn Vargas, PT, DPT 8/17/2022

## 2022-08-23 ENCOUNTER — HOSPITAL ENCOUNTER (OUTPATIENT)
Dept: PHYSICAL THERAPY | Age: 72
Discharge: HOME | End: 2022-08-23
Payer: COMMERCIAL

## 2022-08-23 PROCEDURE — 97110 THERAPEUTIC EXERCISES: CPT

## 2022-08-23 PROCEDURE — 97112 NEUROMUSCULAR REEDUCATION: CPT

## 2022-08-23 PROCEDURE — 97140 MANUAL THERAPY 1/> REGIONS: CPT

## 2022-08-23 NOTE — PROGRESS NOTES
PT DAILY TREATMENT NOTE - [facility] 2-15    Patient Name: Josh Franco  Date:2022  : 1950  [x]  Patient  Verified  Payor: Easton Annpetey / Plan: [address] HMO / Product Type: HMO /    In time: 10:31A Out time: 11:35A  Total Treatment Time (min): 54  Total Timed Codes (min): 38  1:1 Treatment Time ( only): --   Visit #: 4    Treatment Area: Pain in left ankle and joints of left foot [M25.572]    SUBJECTIVE  Pain Level (0-10 scale): 6/10  Any medication changes, allergies to medications, adverse drug reactions, diagnosis change, or new procedure performed?: [x] No    [] Yes (see summary sheet for update)  Subjective functional status/changes:   [] No changes reported  Pt reported pushing his 1/2 kneel DF on . Had ore pain yesterday and dd not do much. Feeling better today. OBJECTIVE    Modality rationale: decrease inflammation and decrease pain to improve the patients ability to ambulate with decreased pain or discomfort.     Min Type Additional Details       [] Estim: []Att   []Unatt    []TENS instruct                  []IFC  []Premod   []NMES                     []Other:  []w/US   []w/ice   []w/heat  Position:  Location:       []  Traction: [] Cervical       []Lumbar                       [] Prone          []Supine                       []Intermittent   []Continuous Lbs:  [] before manual  [] after manual  []w/heat    []  Ultrasound: []Continuous   [] Pulsed                       at: []1MHz   []3MHz Location:  W/cm2:    [] Paraffin         Location:   []w/heat   16 []  Ice     [x]  Heat (pre) and (post)  []  Ice massage Position: Supine  Location: R ankle    []  Laser  []  Other: Position:  Location:      []  Vasopneumatic Device  Pressure:       [] lo [] med [] hi   Temperature:      [x] Skin assessment post-treatment:  [x]intact []redness- no adverse reaction    []redness - adverse reaction:     7 min Therapeutic Exercise:  [x] See flow sheet : DF PROM   Rationale: increase ROM and increase strength to improve the patients ability to ambulate with decreased pain or discomfort. 8 min Neuromuscular Re-education:  [x]  See flow sheet : BAPS, short foot   Rationale: increase ROM, increase strength, improve balance, and increase proprioception  to improve the patients ability to ambulate and perform functional activities with decreased pain. 23 min Manual Therapy: AP distal fib mobs; talocrural distraction, AP talocrural mobs in long-sitting; lateral calcaneal glide in side-lying; CKC DF mobs in half-kneel with mob belt   Rationale: decrease pain, increase ROM, and increase tissue extensibility to improve the patients ability to ambulate and perform functional activities with decreased pain or restriction. With   [] TE   [] TA   [] Neuro   [] SC   [] other: Patient Education: [x] Review HEP    [] Progressed/Changed HEP based on:   [] positioning   [] body mechanics   [] transfers   [] heat/ice application    [] other:      Other Objective/Functional Measures:   DF PROM long-sitting: AROM: -2 (pre manual)--> 0 deg (post manual)  PROM: 0 (pre manual) --> +5 deg (post manual)  CKC DF: 22    Pain Level (0-10 scale) post treatment: 0/10    ASSESSMENT/Changes in Function:   Noted improvement in ROM with manual today. Reported slight pain along posterior aspect of lateral malleolus following CKC DF mobs. Patient will continue to benefit from skilled PT services to modify and progress therapeutic interventions, address functional mobility deficits, address ROM deficits, address strength deficits, analyze and address soft tissue restrictions, analyze and cue movement patterns, analyze and modify body mechanics/ergonomics, assess and modify postural abnormalities, and address imbalance/dizziness to attain remaining goals. [x]  See Plan of Care  []  See progress note/recertification  []  See Discharge Summary         Progress towards goals / Updated goals:     Short Term Goals:  To be accomplished in 8 treatments:               1. Pt will be independent and compliant with HEP. 2. Pt will improve CKC dorsiflexion ROM from 17 to >/= 22 degrees to improve performance of functional activities. 3. Pt will be able to maintain tandem stance on NC surface bilaterally >/= 20 seconds demonstrating improved foot/ankle strategy. Long Term Goals: To be accomplished in 16 treatments:               1. Pt will be independent and compliant with HEP. 2. Pt will improve FOTO score by the MCID from 41 to >/= 62 demonstrating improved overall function with decreased pain or discomfort. 3. Pt will improve CKC dorsiflexion ROM from 17 to >/= 27 degrees to improve performance of functional activities. 4. Pt will be able to maintain tandem stance on NC surface bilaterally >/= 30 seconds demonstrating improved foot/ankle strategy. 5. Pt will be able to perform SLS on NC surface >/= 10 seconds demonstrating improved foot/ankle strategy. 6. Pt will be able to descend stairs in a reciprocal pattern without needing to turn backwards. 7. Pt will be able to walk >/= 30 minutes before requiring a rest break secondary to pain or discomfort.     PLAN  [x]  Upgrade activities as tolerated     [x]  Continue plan of care  []  Update interventions per flow sheet       []  Discharge due to:_  []  Other:_      Obed Contreras, PTA 8/23/2022

## 2022-08-25 ENCOUNTER — HOSPITAL ENCOUNTER (OUTPATIENT)
Dept: PHYSICAL THERAPY | Age: 72
Discharge: HOME | End: 2022-08-25
Payer: COMMERCIAL

## 2022-08-25 PROCEDURE — 97110 THERAPEUTIC EXERCISES: CPT

## 2022-08-25 PROCEDURE — 97112 NEUROMUSCULAR REEDUCATION: CPT

## 2022-08-25 PROCEDURE — 97140 MANUAL THERAPY 1/> REGIONS: CPT

## 2022-08-25 NOTE — PROGRESS NOTES
PT DAILY TREATMENT NOTE - [facility] 2-15    Patient Name: Melissa Savage  Date:2022  : 1950  [x]  Patient  Verified  Payor: Higinio Mcpherson / Plan: [employer]MO / Product Type: HMO /    In time: 11:45A Out time: 1:00P  Total Treatment Time (min): 75  Total Timed Codes (min): 59  1:1 Treatment Time ( only): --   Visit #: 5    Treatment Area: Pain in left ankle and joints of left foot [M25.572]    SUBJECTIVE  Pain Level (0-10 scale): 7  Any medication changes, allergies to medications, adverse drug reactions, diagnosis change, or new procedure performed?: [x] No    [] Yes (see summary sheet for update)  Subjective functional status/changes:   [] No changes reported  Pt reports his ankle was pretty angry yesterday (anterior ankle and posterior to lateral malleolus). A little better today. OBJECTIVE    Modality rationale: decrease inflammation and decrease pain to improve the patients ability to ambulate with decreased pain or discomfort.     Min Type Additional Details       [] Estim: []Att   []Unatt    []TENS instruct                  []IFC  []Premod   []NMES                     []Other:  []w/US   []w/ice   []w/heat  Position:  Location:       []  Traction: [] Cervical       []Lumbar                       [] Prone          []Supine                       []Intermittent   []Continuous Lbs:  [] before manual  [] after manual  []w/heat    []  Ultrasound: []Continuous   [] Pulsed                       at: []1MHz   []3MHz Location:  W/cm2:    [] Paraffin         Location:   []w/heat   16 []  Ice     [x]  Heat (8 pre) and (8 post)  []  Ice massage Position: Supine  Location: R ankle    []  Laser  []  Other: Position:  Location:      []  Vasopneumatic Device  Pressure:       [] lo [] med [] hi   Temperature:      [x] Skin assessment post-treatment:  [x]intact []redness- no adverse reaction    []redness - adverse reaction:     23 min Therapeutic Exercise:  [x] See flow sheet : DF PROM   Rationale: increase ROM and increase strength to improve the patients ability to ambulate with decreased pain or discomfort. 8 min Neuromuscular Re-education:  [x]  See flow sheet : BAPS, short foot   Rationale: increase ROM, increase strength, improve balance, and increase proprioception  to improve the patients ability to ambulate and perform functional activities with decreased pain. 28 min Manual Therapy: AP distal fib mobs; talocrural distraction, AP talocrural mobs in long-sitting; lateral calcaneal glide in side-lying; CKC DF mobs in half-kneel with mob belt   Rationale: decrease pain, increase ROM, and increase tissue extensibility to improve the patients ability to ambulate and perform functional activities with decreased pain or restriction. With   [] TE   [] TA   [] Neuro   [] SC   [] other: Patient Education: [x] Review HEP    [] Progressed/Changed HEP based on:   [] positioning   [] body mechanics   [] transfers   [] heat/ice application    [] other:      Other Objective/Functional Measures:   DF PROM long-sitting: AROM: -8 (pre manual)--> -4 deg (post manual)  PROM: -2 (pre manual) --> +2 deg (post manual)  CKC DF: 22    Pain Level (0-10 scale) post treatment: 0/10    ASSESSMENT/Changes in Function:   Pt tolerated CKC DF mobs well today without increased pain today. Utilized TG squats with feet in a low position to facilitate improved dorsiflexion mobility; Pt tolerated well without increased knee or ankle pain. Patient will continue to benefit from skilled PT services to modify and progress therapeutic interventions, address functional mobility deficits, address ROM deficits, address strength deficits, analyze and address soft tissue restrictions, analyze and cue movement patterns, analyze and modify body mechanics/ergonomics, assess and modify postural abnormalities, and address imbalance/dizziness to attain remaining goals.      [x]  See Plan of Care  []  See progress note/recertification  []  See Discharge Summary         Progress towards goals / Updated goals:     Short Term Goals: To be accomplished in 8 treatments:               1. Pt will be independent and compliant with HEP. 2. Pt will improve CKC dorsiflexion ROM from 17 to >/= 22 degrees to improve performance of functional activities. 3. Pt will be able to maintain tandem stance on NC surface bilaterally >/= 20 seconds demonstrating improved foot/ankle strategy. Long Term Goals: To be accomplished in 16 treatments:               1. Pt will be independent and compliant with HEP. 2. Pt will improve FOTO score by the MCID from 41 to >/= 62 demonstrating improved overall function with decreased pain or discomfort. 3. Pt will improve CKC dorsiflexion ROM from 17 to >/= 27 degrees to improve performance of functional activities. 4. Pt will be able to maintain tandem stance on NC surface bilaterally >/= 30 seconds demonstrating improved foot/ankle strategy. 5. Pt will be able to perform SLS on NC surface >/= 10 seconds demonstrating improved foot/ankle strategy. 6. Pt will be able to descend stairs in a reciprocal pattern without needing to turn backwards. 7. Pt will be able to walk >/= 30 minutes before requiring a rest break secondary to pain or discomfort.     PLAN  [x]  Upgrade activities as tolerated     [x]  Continue plan of care  []  Update interventions per flow sheet       []  Discharge due to:_  []  Other:_      Terry Hairston, PT, DPT 8/25/2022

## 2022-08-31 ENCOUNTER — HOSPITAL ENCOUNTER (OUTPATIENT)
Dept: PHYSICAL THERAPY | Age: 72
Discharge: HOME | End: 2022-08-31
Payer: COMMERCIAL

## 2022-08-31 PROCEDURE — 97112 NEUROMUSCULAR REEDUCATION: CPT

## 2022-08-31 PROCEDURE — 97140 MANUAL THERAPY 1/> REGIONS: CPT

## 2022-08-31 NOTE — PROGRESS NOTES
PT DAILY TREATMENT NOTE - [facility] 2-15    Patient Name: Jason Cardoso  Date:2022  : 1950  [x]  Patient  Verified  Payor: Mary Severino / Plan: [employer] HMO / Product Type: HMO /    In time: 11:01A Out time: 12:05P  Total Treatment Time (min): 64  Total Timed Codes (min): 48  1:1 Treatment Time ( only): --   Visit #: 6    Treatment Area: Pain in left ankle and joints of left foot [M25.572]    SUBJECTIVE  Pain Level (0-10 scale): 6  Any medication changes, allergies to medications, adverse drug reactions, diagnosis change, or new procedure performed?: [x] No    [] Yes (see summary sheet for update)  Subjective functional status/changes:   [] No changes reported  Pt reports the standing lunge exercise seems to help. Not limping for days after activity/exercises like he was before starting PT.     OBJECTIVE    Modality rationale: decrease inflammation and decrease pain to improve the patients ability to ambulate with decreased pain or discomfort.     Min Type Additional Details       [] Estim: []Att   []Unatt    []TENS instruct                  []IFC  []Premod   []NMES                     []Other:  []w/US   []w/ice   []w/heat  Position:  Location:       []  Traction: [] Cervical       []Lumbar                       [] Prone          []Supine                       []Intermittent   []Continuous Lbs:  [] before manual  [] after manual  []w/heat    []  Ultrasound: []Continuous   [] Pulsed                       at: []1MHz   []3MHz Location:  W/cm2:    [] Paraffin         Location:   []w/heat   16 []  Ice     [x]  Heat (8 pre) and (8 post)  []  Ice massage Position: Supine  Location: R ankle    []  Laser  []  Other: Position:  Location:      []  Vasopneumatic Device  Pressure:       [] lo [] med [] hi   Temperature:      [x] Skin assessment post-treatment:  [x]intact []redness- no adverse reaction    []redness - adverse reaction:     8 min Therapeutic Exercise:  [x] See flow sheet : DF PROM   Rationale: increase ROM and increase strength to improve the patients ability to ambulate with decreased pain or discomfort. 15 min Neuromuscular Re-education:  [x]  See flow sheet : BAPS, short foot, 3/4 tandem, rockerboard   Rationale: increase ROM, increase strength, improve balance, and increase proprioception  to improve the patients ability to ambulate and perform functional activities with decreased pain. 25 min Manual Therapy: AP distal fib mobs; talocrural distraction, AP talocrural mobs in long-sitting; lateral calcaneal glide in side-lying; CKC DF mobs in half-kneel with mob belt   Rationale: decrease pain, increase ROM, and increase tissue extensibility to improve the patients ability to ambulate and perform functional activities with decreased pain or restriction. With   [] TE   [] TA   [] Neuro   [] SC   [] other: Patient Education: [x] Review HEP    [] Progressed/Changed HEP based on:   [] positioning   [] body mechanics   [] transfers   [] heat/ice application    [] other:      Other Objective/Functional Measures:   CKC DF: 27 (17 on eval)    Eversion: AROM: +2  PROM: +5  Inversion: AROM: +8  PROM: +10    Pain Level (0-10 scale) post treatment: 0/10    ASSESSMENT/Changes in Function:   Much improved CKC DF ROM today, up to 27 degrees. Initiated 3/4 tandem stance on NC surface to promote improved proprioception for longer hold times, as full tandem stance still limited to <20 seconds; added to HEP. Patient will continue to benefit from skilled PT services to modify and progress therapeutic interventions, address functional mobility deficits, address ROM deficits, address strength deficits, analyze and address soft tissue restrictions, analyze and cue movement patterns, analyze and modify body mechanics/ergonomics, assess and modify postural abnormalities, and address imbalance/dizziness to attain remaining goals.      [x]  See Plan of Care  []  See progress note/recertification  []  See Discharge Summary         Progress towards goals / Updated goals:     Short Term Goals: To be accomplished in 8 treatments:               1. Pt will be independent and compliant with HEP. 2. Pt will improve CKC dorsiflexion ROM from 17 to >/= 22 degrees to improve performance of functional activities. - MET               3. Pt will be able to maintain tandem stance on NC surface bilaterally >/= 20 seconds demonstrating improved foot/ankle strategy. - Progressing  Long Term Goals: To be accomplished in 16 treatments:               1. Pt will be independent and compliant with HEP. 2. Pt will improve FOTO score by the MCID from 41 to >/= 62 demonstrating improved overall function with decreased pain or discomfort. 3. Pt will improve CKC dorsiflexion ROM from 17 to >/= 27 degrees to improve performance of functional activities. 4. Pt will be able to maintain tandem stance on NC surface bilaterally >/= 30 seconds demonstrating improved foot/ankle strategy. 5. Pt will be able to perform SLS on NC surface >/= 10 seconds demonstrating improved foot/ankle strategy. - Progressing               6. Pt will be able to descend stairs in a reciprocal pattern without needing to turn backwards. 7. Pt will be able to walk >/= 30 minutes before requiring a rest break secondary to pain or discomfort.     PLAN  [x]  Upgrade activities as tolerated     [x]  Continue plan of care  []  Update interventions per flow sheet       []  Discharge due to:_  []  Other:_      Terry Hairston, PT, DPT 8/31/2022

## 2022-09-06 ENCOUNTER — HOSPITAL ENCOUNTER (OUTPATIENT)
Dept: PHYSICAL THERAPY | Age: 72
Discharge: HOME | End: 2022-09-06
Payer: COMMERCIAL

## 2022-09-06 PROCEDURE — 97110 THERAPEUTIC EXERCISES: CPT

## 2022-09-06 PROCEDURE — 97140 MANUAL THERAPY 1/> REGIONS: CPT

## 2022-09-06 PROCEDURE — 97112 NEUROMUSCULAR REEDUCATION: CPT

## 2022-09-06 NOTE — PROGRESS NOTES
PT DAILY TREATMENT NOTE - [facility] 2-15    Patient Name: Jacob Neri  Date:2022  : 1950  [x]  Patient  Verified  Payor: Dudley Keller / Plan: [employer] / Product Type: HMO /    In time: 3:17P Out time: 4:29P  Total Treatment Time (min): 72  Total Timed Codes (min): 56  1:1 Treatment Time ( only): --   Visit #: 7    Treatment Area: Pain in left ankle and joints of left foot [M25.572]    SUBJECTIVE  Pain Level (0-10 scale): 5  Any medication changes, allergies to medications, adverse drug reactions, diagnosis change, or new procedure performed?: [x] No    [] Yes (see summary sheet for update)  Subjective functional status/changes:   [] No changes reported  Pt reports continuing to feel more mobility. OBJECTIVE    Modality rationale: decrease inflammation and decrease pain to improve the patients ability to ambulate with decreased pain or discomfort.     Min Type Additional Details       [] Estim: []Att   []Unatt    []TENS instruct                  []IFC  []Premod   []NMES                     []Other:  []w/US   []w/ice   []w/heat  Position:  Location:       []  Traction: [] Cervical       []Lumbar                       [] Prone          []Supine                       []Intermittent   []Continuous Lbs:  [] before manual  [] after manual  []w/heat    []  Ultrasound: []Continuous   [] Pulsed                       at: []1MHz   []3MHz Location:  W/cm2:    [] Paraffin         Location:   []w/heat   16 []  Ice     [x]  Heat (8 pre) and (8 post)  []  Ice massage Position: Supine  Location: R ankle    []  Laser  []  Other: Position:  Location:      []  Vasopneumatic Device  Pressure:       [] lo [] med [] hi   Temperature:      [x] Skin assessment post-treatment:  [x]intact []redness- no adverse reaction    []redness - adverse reaction:     25 min Therapeutic Exercise:  [x] See flow sheet : DF PROM; re-assessment   Rationale: increase ROM and increase strength to improve the patients ability to ambulate with decreased pain or discomfort. 15 min Neuromuscular Re-education:  [x]  See flow sheet : BAPS, tandem NC, rockerboard; foam narrow MANDY   Rationale: increase ROM, increase strength, improve balance, and increase proprioception  to improve the patients ability to ambulate and perform functional activities with decreased pain. 15 min Manual Therapy: AP distal fib mobs; talocrural distraction, AP talocrural mobs in long-sitting; lateral calcaneal glide in side-lying; CKC DF mobs in half-kneel with mob belt   Rationale: decrease pain, increase ROM, and increase tissue extensibility to improve the patients ability to ambulate and perform functional activities with decreased pain or restriction.     With   [] TE   [] TA   [] Neuro   [] SC   [] other: Patient Education: [x] Review HEP    [] Progressed/Changed HEP based on:   [] positioning   [] body mechanics   [] transfers   [] heat/ice application    [] other:      Other Objective/Functional Measures:   0-100: 50% improved - \"substantial amount of improvement\"; would like to get enough dorsiflexion to be able to descend stairs; would like to further reduce pain; would like to have confidence in stability of foot/ankle when bearing weight on LLE; still continued lateral L foot pain    FOTO Score: 44 (41 on eval)    Evaluation vs Today (9/6/22)    L ankle ROM:                         AROM                         PROM              Flexion /DF                  -11    -8                        -6 anterolateral ankle p!   0 \"neutral\"              Extension/PF               35      44                       37   45              IR/Sup/Ev                    0     2                         2   5              ER/Pron/Inv                 6      5                         9   16    CKC DF:               L: 17 posterior malleolus \"ripping feeling\" --> 25    SLS: L: 4 seconds, lack of any ankle strategy  --> 17\"  Tandem: L: 9\" --> 20\"    R: 26\" -->  30+\"     Pain Level (0-10 scale) post treatment: 0/10    ASSESSMENT/Changes in Function:      []  See Plan of Care  [x]  See progress note/recertification  []  See Discharge Summary         Progress towards goals / Updated goals:     Short Term Goals: To be accomplished in 8 treatments:               1. Pt will be independent and compliant with HEP. - MET               2. Pt will improve CKC dorsiflexion ROM from 17 to >/= 22 degrees to improve performance of functional activities. - MET               3. Pt will be able to maintain tandem stance on NC surface bilaterally >/= 20 seconds demonstrating improved foot/ankle strategy. - Progressing  Long Term Goals: To be accomplished in 16 treatments:               1. Pt will be independent and compliant with HEP. 2. Pt will improve FOTO score by the MCID from 41 to >/= 62 demonstrating improved overall function with decreased pain or discomfort. 3. Pt will improve CKC dorsiflexion ROM from 17 to >/= 27 degrees to improve performance of functional activities. - Progressing               4. Pt will be able to maintain tandem stance on NC surface bilaterally >/= 30 seconds demonstrating improved foot/ankle strategy. - Progressing               5. Pt will be able to perform SLS on NC surface >/= 10 seconds demonstrating improved foot/ankle strategy. - MET               6. Pt will be able to descend stairs in a reciprocal pattern without needing to turn backwards. - Progressing               7. Pt will be able to walk >/= 30 minutes before requiring a rest break secondary to pain or discomfort.  - Progressing    PLAN  [x]  Upgrade activities as tolerated     [x]  Continue plan of care  []  Update interventions per flow sheet       []  Discharge due to:_  []  Other:_      Jv Durán, PT, DPT 9/6/2022

## 2022-09-08 ENCOUNTER — HOSPITAL ENCOUNTER (OUTPATIENT)
Dept: PHYSICAL THERAPY | Age: 72
Discharge: HOME | End: 2022-09-08
Payer: COMMERCIAL

## 2022-09-08 PROCEDURE — 97140 MANUAL THERAPY 1/> REGIONS: CPT

## 2022-09-08 PROCEDURE — 97112 NEUROMUSCULAR REEDUCATION: CPT

## 2022-09-08 PROCEDURE — 97110 THERAPEUTIC EXERCISES: CPT

## 2022-09-08 NOTE — PROGRESS NOTES
PT DAILY TREATMENT NOTE - [facility] 2-15    Patient Name: Louisa Holstein  Date:2022  : 1950  [x]  Patient  Verified  Payor: Kathleen Chapman / Plan: [employer] / Product Type: HMO /    In time: 11:15 A Out time: 12:15 P  Total Treatment Time (min): 60  Total Timed Codes (min): 44  1:1 Treatment Time ( only): --   Visit #: 8    Treatment Area: Pain in left ankle and joints of left foot [M25.572]    SUBJECTIVE  Pain Level (0-10 scale): 6  Any medication changes, allergies to medications, adverse drug reactions, diagnosis change, or new procedure performed?: [x] No    [] Yes (see summary sheet for update)  Subjective functional status/changes:   [] No changes reported  Pt reports a little soreness today, particularly along ATFL area. Becomes a bit irritated with some of the standing proprioceptive work. \"I'm not limping as much as I was. \"    OBJECTIVE    Modality rationale: decrease inflammation and decrease pain to improve the patients ability to ambulate with decreased pain or discomfort.     Min Type Additional Details       [] Estim: []Att   []Unatt    []TENS instruct                  []IFC  []Premod   []NMES                     []Other:  []w/US   []w/ice   []w/heat  Position:  Location:       []  Traction: [] Cervical       []Lumbar                       [] Prone          []Supine                       []Intermittent   []Continuous Lbs:  [] before manual  [] after manual  []w/heat    []  Ultrasound: []Continuous   [] Pulsed                       at: []1MHz   []3MHz Location:  W/cm2:    [] Paraffin         Location:   []w/heat   16 []  Ice     [x]  Heat (8 pre) and (8 post)  []  Ice massage Position: Supine  Location: R ankle    []  Laser  []  Other: Position:  Location:      []  Vasopneumatic Device  Pressure:       [] lo [] med [] hi   Temperature:      [x] Skin assessment post-treatment:  [x]intact []redness- no adverse reaction    []redness - adverse reaction:     14 min Therapeutic Exercise:  [x] See flow sheet : DF PROM; re-assessment   Rationale: increase ROM and increase strength to improve the patients ability to ambulate with decreased pain or discomfort. 14 min Neuromuscular Re-education:  [x]  See flow sheet : BAPS, tandem NC, rockerboard; foam narrow MANDY   Rationale: increase ROM, increase strength, improve balance, and increase proprioception  to improve the patients ability to ambulate and perform functional activities with decreased pain. 16 min Manual Therapy: AP distal fib mobs; talocrural distraction, AP talocrural mobs in long-sitting; lateral calcaneal glide in side-lying; CKC DF mobs in half-kneel with mob belt   Rationale: decrease pain, increase ROM, and increase tissue extensibility to improve the patients ability to ambulate and perform functional activities with decreased pain or restriction.     With   [] TE   [] TA   [] Neuro   [] SC   [] other: Patient Education: [x] Review HEP    [] Progressed/Changed HEP based on:   [] positioning   [] body mechanics   [] transfers   [] heat/ice application    [] other:      Other Objective/Functional Measures:   0-100: 50% improved - \"substantial amount of improvement\"; would like to get enough dorsiflexion to be able to descend stairs; would like to further reduce pain; would like to have confidence in stability of foot/ankle when bearing weight on LLE; still continued lateral L foot pain    FOTO Score: 44 (41 on eval)    Evaluation vs Today (9/6/22)    L ankle ROM:                         AROM                         PROM              Flexion /DF                  -11    -8                        -6 anterolateral ankle p!   0 \"neutral\"              Extension/PF               35      44                       37   45              IR/Sup/Ev                    0     2                         2   5              ER/Pron/Inv                 6      5                         9   16    CKC DF:               L: 17 posterior malleolus \"ripping feeling\" --> 25    SLS: L: 4 seconds, lack of any ankle strategy  --> 17\"  Tandem: L: 9\" --> 20\"    R: 26\" -->  30+\"     Pain Level (0-10 scale) post treatment: 0/10    ASSESSMENT/Changes in Function:   Progressed short foot to standing with support. Pt demonstrated very good performance of eccentric touchdowns off 2\" step today after manual treatment. Patient will continue to benefit from skilled PT services to modify and progress therapeutic interventions, address functional mobility deficits, address ROM deficits, address strength deficits, analyze and address soft tissue restrictions, analyze and cue movement patterns, analyze and modify body mechanics/ergonomics, assess and modify postural abnormalities, and address imbalance/dizziness to attain remaining goals. []  See Plan of Care  [x]  See progress note/recertification  []  See Discharge Summary         Progress towards goals / Updated goals:     Short Term Goals: To be accomplished in 8 treatments:               1. Pt will be independent and compliant with HEP. - MET               2. Pt will improve CKC dorsiflexion ROM from 17 to >/= 22 degrees to improve performance of functional activities. - MET               3. Pt will be able to maintain tandem stance on NC surface bilaterally >/= 20 seconds demonstrating improved foot/ankle strategy. - Progressing  Long Term Goals: To be accomplished in 16 treatments:               1. Pt will be independent and compliant with HEP. 2. Pt will improve FOTO score by the MCID from 41 to >/= 62 demonstrating improved overall function with decreased pain or discomfort. 3. Pt will improve CKC dorsiflexion ROM from 17 to >/= 27 degrees to improve performance of functional activities. - Progressing               4. Pt will be able to maintain tandem stance on NC surface bilaterally >/= 30 seconds demonstrating improved foot/ankle strategy.  - Progressing               5. Pt will be able to perform SLS on NC surface >/= 10 seconds demonstrating improved foot/ankle strategy. - MET               6. Pt will be able to descend stairs in a reciprocal pattern without needing to turn backwards. - Progressing               7. Pt will be able to walk >/= 30 minutes before requiring a rest break secondary to pain or discomfort.  - Progressing    PLAN  [x]  Upgrade activities as tolerated     [x]  Continue plan of care  []  Update interventions per flow sheet       []  Discharge due to:_  []  Other:_      Blanca Portillo, PT, DPT 9/8/2022

## 2022-09-13 ENCOUNTER — HOSPITAL ENCOUNTER (OUTPATIENT)
Dept: PHYSICAL THERAPY | Age: 72
Discharge: HOME | End: 2022-09-13
Payer: COMMERCIAL

## 2022-09-13 PROCEDURE — 97140 MANUAL THERAPY 1/> REGIONS: CPT

## 2022-09-13 PROCEDURE — 97112 NEUROMUSCULAR REEDUCATION: CPT

## 2022-09-13 PROCEDURE — 97110 THERAPEUTIC EXERCISES: CPT

## 2022-09-13 PROCEDURE — 97016 VASOPNEUMATIC DEVICE THERAPY: CPT

## 2022-09-13 NOTE — PROGRESS NOTES
PT DAILY TREATMENT NOTE - [facility] 2-15    Patient Name: Dakota Alvarado  Date:2022  : 1950  [x]  Patient  Verified  Payor: Kristal Diana / Plan: [address] HMO / Product Type: HMO /    In time: 11:15 A Out time: 12:15 P  Total Treatment Time (min): 60  Total Timed Codes (min): 42  1:1 Treatment Time ( only): --   Visit #: 9    Treatment Area: Pain in left ankle and joints of left foot [M25.572]    SUBJECTIVE  Pain Level (0-10 scale): 6  Any medication changes, allergies to medications, adverse drug reactions, diagnosis change, or new procedure performed?: [x] No    [] Yes (see summary sheet for update)  Subjective functional status/changes:   [] No changes reported  Pt reports he stepped on a slight piece of uneven ground last Thursday afternoon, slightly rolling his ankle, and did not think anything of it initially. He experienced significant pain lateral ankle and foot pain the next day. No bruising Feels stiffer and more sore and he is walking with a limp again. OBJECTIVE    Modality rationale: decrease inflammation and decrease pain to improve the patients ability to ambulate with decreased pain or discomfort.     Min Type Additional Details       [] Estim: []Att   []Unatt    []TENS instruct                  []IFC  []Premod   []NMES                     []Other:  []w/US   []w/ice   []w/heat  Position:  Location:       []  Traction: [] Cervical       []Lumbar                       [] Prone          []Supine                       []Intermittent   []Continuous Lbs:  [] before manual  [] after manual  []w/heat    []  Ultrasound: []Continuous   [] Pulsed                       at: []1MHz   []3MHz Location:  W/cm2:    [] Paraffin         Location:   []w/heat   8 []  Ice     [x]  Heat (8 pre)   []  Ice massage Position: Supine  Location: R ankle    []  Laser  []  Other: Position:  Location:   10   [x]  Vasopneumatic Device  Pressure:       [] lo [x] med [] hi   Temperature: 38     [x] Skin assessment post-treatment:  [x]intact []redness- no adverse reaction    []redness - adverse reaction:     21 min Therapeutic Exercise:  [x] See flow sheet : DF PROM   Rationale: increase ROM and increase strength to improve the patients ability to ambulate with decreased pain or discomfort. 9 min Neuromuscular Re-education:  [x]  See flow sheet : BAPS, 4-way ankle isometrics   Rationale: increase ROM, increase strength, improve balance, and increase proprioception  to improve the patients ability to ambulate and perform functional activities with decreased pain. 12 min Manual Therapy: talocrural distraction, AP talocrural mobs in long-sitting; STM and MFR of L calf   Rationale: decrease pain, increase ROM, and increase tissue extensibility to improve the patients ability to ambulate and perform functional activities with decreased pain or restriction. With   [] TE   [] TA   [] Neuro   [] SC   [] other: Patient Education: [x] Review HEP    [] Progressed/Changed HEP based on:   [] positioning   [] body mechanics   [] transfers   [] heat/ice application    [] other:      Other Objective/Functional Measures: --     Pain Level (0-10 scale) post treatment: 4/10    ASSESSMENT/Changes in Function:   Session limited due to increased soreness/pain within left foot/ankle today. Pt able to tolerate BAPS, though limited inversion ROM due to tension noted by Pt. Utilized 4-way isometrics to improve surrounding muscle activation and decrease pain; added to HEP for now. Patient will continue to benefit from skilled PT services to modify and progress therapeutic interventions, address functional mobility deficits, address ROM deficits, address strength deficits, analyze and address soft tissue restrictions, analyze and cue movement patterns, analyze and modify body mechanics/ergonomics, assess and modify postural abnormalities, and address imbalance/dizziness to attain remaining goals.      []  See Plan of Care  [x]  See progress note/recertification  []  See Discharge Summary         Progress towards goals / Updated goals:     Short Term Goals: To be accomplished in 8 treatments:               1. Pt will be independent and compliant with HEP. - MET               2. Pt will improve CKC dorsiflexion ROM from 17 to >/= 22 degrees to improve performance of functional activities. - MET               3. Pt will be able to maintain tandem stance on NC surface bilaterally >/= 20 seconds demonstrating improved foot/ankle strategy. - Progressing  Long Term Goals: To be accomplished in 16 treatments:               1. Pt will be independent and compliant with HEP. 2. Pt will improve FOTO score by the MCID from 41 to >/= 62 demonstrating improved overall function with decreased pain or discomfort. 3. Pt will improve CKC dorsiflexion ROM from 17 to >/= 27 degrees to improve performance of functional activities. - Progressing               4. Pt will be able to maintain tandem stance on NC surface bilaterally >/= 30 seconds demonstrating improved foot/ankle strategy. - Progressing               5. Pt will be able to perform SLS on NC surface >/= 10 seconds demonstrating improved foot/ankle strategy. - MET               6. Pt will be able to descend stairs in a reciprocal pattern without needing to turn backwards. - Progressing               7. Pt will be able to walk >/= 30 minutes before requiring a rest break secondary to pain or discomfort.  - Progressing    PLAN  [x]  Upgrade activities as tolerated     [x]  Continue plan of care  []  Update interventions per flow sheet       []  Discharge due to:_  []  Other:_      Yoselin Manning, PT, DPT 9/13/2022

## 2022-09-15 ENCOUNTER — HOSPITAL ENCOUNTER (OUTPATIENT)
Dept: PHYSICAL THERAPY | Age: 72
Discharge: HOME | End: 2022-09-15
Payer: COMMERCIAL

## 2022-09-15 PROCEDURE — 97140 MANUAL THERAPY 1/> REGIONS: CPT

## 2022-09-15 PROCEDURE — 97110 THERAPEUTIC EXERCISES: CPT

## 2022-09-15 PROCEDURE — 97016 VASOPNEUMATIC DEVICE THERAPY: CPT

## 2022-09-15 NOTE — PROGRESS NOTES
PT DAILY TREATMENT NOTE - [facility] 2-15    Patient Name: Dakota Alvarado  Date:9/15/2022  : 1950  [x]  Patient  Verified  Payor: Kristal Diana / Plan: [address] HMO / Product Type: HMO /    In time: 11:15 A Out time: 12:22 P  Total Treatment Time (min): 67  Total Timed Codes (min): 57  1:1 Treatment Time ( only): --   Visit #: 10    Treatment Area: Pain in left ankle and joints of left foot [M25.572]    SUBJECTIVE  Pain Level (0-10 scale): 6  Any medication changes, allergies to medications, adverse drug reactions, diagnosis change, or new procedure performed?: [x] No    [] Yes (see summary sheet for update)  Subjective functional status/changes:   [] No changes reported  Pt reports no real change since last session. Nearly constant sharp pain posterior to lateral malleolus. Standing is particularly painful. OBJECTIVE    Modality rationale: decrease inflammation and decrease pain to improve the patients ability to ambulate with decreased pain or discomfort.     Min Type Additional Details       [] Estim: []Att   []Unatt    []TENS instruct                  []IFC  []Premod   []NMES                     []Other:  []w/US   []w/ice   []w/heat  Position:  Location:       []  Traction: [] Cervical       []Lumbar                       [] Prone          []Supine                       []Intermittent   []Continuous Lbs:  [] before manual  [] after manual  []w/heat    []  Ultrasound: []Continuous   [] Pulsed                       at: []1MHz   []3MHz Location:  W/cm2:    [] Paraffin         Location:   []w/heat   8 []  Ice     [x]  Heat (8 pre)   []  Ice massage Position: Supine  Location: R ankle    []  Laser  []  Other: Position:  Location:   10   [x]  Vasopneumatic Device  Pressure:       [] lo [x] med [] hi   Temperature: 38     [x] Skin assessment post-treatment:  [x]intact []redness- no adverse reaction    []redness - adverse reaction:     32 min Therapeutic Exercise:  [x] See flow sheet : DF PROM   Rationale: increase ROM and increase strength to improve the patients ability to ambulate with decreased pain or discomfort. 25 min Manual Therapy: talocrural distraction, AP talocrural mobs in long-sitting; distal fibular mobs in supine; IASTM of L calf and peroneals; navicular sling taping technique   Rationale: decrease pain, increase ROM, and increase tissue extensibility to improve the patients ability to ambulate and perform functional activities with decreased pain or restriction. With   [] TE   [] TA   [] Neuro   [] SC   [] other: Patient Education: [x] Review HEP    [] Progressed/Changed HEP based on:   [] positioning   [] body mechanics   [] transfers   [] heat/ice application    [] other:      Other Objective/Functional Measures: --     Pain Level (0-10 scale) post treatment: 4/10    ASSESSMENT/Changes in Function:   Attempted cuboid mobilizations in prone secondary to location of continued severe pain, though did not perform manipulation secondary to acute stage of recent inversion sprain; Pt tolerated well. Attempted navicular sling taping technique for medial arch support; Pt noted increased sharp pain along lateral foot upon standing; if continued at home, instructed him to take off the tape and we will target taping in medial to lateral direction next session. Patient will continue to benefit from skilled PT services to modify and progress therapeutic interventions, address functional mobility deficits, address ROM deficits, address strength deficits, analyze and address soft tissue restrictions, analyze and cue movement patterns, analyze and modify body mechanics/ergonomics, assess and modify postural abnormalities, and address imbalance/dizziness to attain remaining goals. []  See Plan of Care  [x]  See progress note/recertification  []  See Discharge Summary         Progress towards goals / Updated goals:     Short Term Goals:  To be accomplished in 8 treatments:               1. Pt will be independent and compliant with HEP. - MET               2. Pt will improve CKC dorsiflexion ROM from 17 to >/= 22 degrees to improve performance of functional activities. - MET               3. Pt will be able to maintain tandem stance on NC surface bilaterally >/= 20 seconds demonstrating improved foot/ankle strategy. - Progressing  Long Term Goals: To be accomplished in 16 treatments:               1. Pt will be independent and compliant with HEP. 2. Pt will improve FOTO score by the MCID from 41 to >/= 62 demonstrating improved overall function with decreased pain or discomfort. 3. Pt will improve CKC dorsiflexion ROM from 17 to >/= 27 degrees to improve performance of functional activities. - Progressing               4. Pt will be able to maintain tandem stance on NC surface bilaterally >/= 30 seconds demonstrating improved foot/ankle strategy. - Progressing               5. Pt will be able to perform SLS on NC surface >/= 10 seconds demonstrating improved foot/ankle strategy. - MET               6. Pt will be able to descend stairs in a reciprocal pattern without needing to turn backwards. - Progressing               7. Pt will be able to walk >/= 30 minutes before requiring a rest break secondary to pain or discomfort.  - Progressing    PLAN  [x]  Upgrade activities as tolerated     [x]  Continue plan of care  []  Update interventions per flow sheet       []  Discharge due to:_  []  Other:_      Rufus Leary, PT, DPT 9/15/2022

## 2022-09-20 ENCOUNTER — HOSPITAL ENCOUNTER (OUTPATIENT)
Dept: PHYSICAL THERAPY | Age: 72
Discharge: HOME | End: 2022-09-20
Payer: COMMERCIAL

## 2022-09-20 PROCEDURE — 97110 THERAPEUTIC EXERCISES: CPT

## 2022-09-20 PROCEDURE — 97140 MANUAL THERAPY 1/> REGIONS: CPT

## 2022-09-20 PROCEDURE — 97112 NEUROMUSCULAR REEDUCATION: CPT

## 2022-09-20 NOTE — PROGRESS NOTES
PT DAILY TREATMENT NOTE - [facility] 2-15    Patient Name: Katrina Solis  Date:2022  : 1950  [x]  Patient  Verified  Payor: Heath Francisco J / Plan: [address] HMO / Product Type: HMO /    In time: 10:34 A Out time: 11:42 P  Total Treatment Time (min): 68  Total Timed Codes (min): 52  1:1 Treatment Time ( only): --   Visit #: 11    Treatment Area: Pain in left ankle and joints of left foot [M25.572]    SUBJECTIVE  Pain Level (0-10 scale): 6  Any medication changes, allergies to medications, adverse drug reactions, diagnosis change, or new procedure performed?: [x] No    [] Yes (see summary sheet for update)  Subjective functional status/changes:   [] No changes reported  Pt reports ankle is a bit better/calmed down since rolling it, but \"the tendon is still screaming from insertion to behind the lateral malleolus. \" He woke up Saturday night with intense burning overall pain, but woke up feeling okay the next morning. OBJECTIVE    Modality rationale: decrease inflammation and decrease pain to improve the patients ability to ambulate with decreased pain or discomfort.     Min Type Additional Details       [] Estim: []Att   []Unatt    []TENS instruct                  []IFC  []Premod   []NMES                     []Other:  []w/US   []w/ice   []w/heat  Position:  Location:       []  Traction: [] Cervical       []Lumbar                       [] Prone          []Supine                       []Intermittent   []Continuous Lbs:  [] before manual  [] after manual  []w/heat    []  Ultrasound: []Continuous   [] Pulsed                       at: []1MHz   []3MHz Location:  W/cm2:    [] Paraffin         Location:   []w/heat   8 []  Ice     [x]  Heat (8 pre and post)   []  Ice massage Position: Supine  Location: R ankle    []  Laser  []  Other: Position:  Location:      []  Vasopneumatic Device  Pressure:       [] lo [] med [] hi   Temperature:      [x] Skin assessment post-treatment:  [x]intact []redness- no adverse reaction    []redness - adverse reaction:     23 min Therapeutic Exercise:  [x] See flow sheet : DF PROM   Rationale: increase ROM and increase strength to improve the patients ability to ambulate with decreased pain or discomfort. 17 min Manual Therapy: talocrural distraction, AP talocrural mobs in long-sitting; distal fibular mobs in supine; IASTM of L calf and peroneals; medial-lateral foot-ankle taping technique for lateral foot support   Rationale: decrease pain, increase ROM, and increase tissue extensibility to improve the patients ability to ambulate and perform functional activities with decreased pain or restriction. 12 min Neuromuscular Re-education:  [x]  See flow sheet : BAPS, 3/4 tandem NC, rockerboard; foam narrow MANDY   Rationale: increase ROM, increase strength, improve balance, and increase proprioception  to improve the patients ability to ambulate and perform functional activities with decreased pain. With   [] TE   [] TA   [] Neuro   [] SC   [] other: Patient Education: [x] Review HEP    [] Progressed/Changed HEP based on:   [] positioning   [] body mechanics   [] transfers   [] heat/ice application    [] other:      Other Objective/Functional Measures: --     Pain Level (0-10 scale) post treatment: 4/10    ASSESSMENT/Changes in Function:   Pt reported and demonstrated improved proprioception/control on BAPS board after taping technique today. Patient will continue to benefit from skilled PT services to modify and progress therapeutic interventions, address functional mobility deficits, address ROM deficits, address strength deficits, analyze and address soft tissue restrictions, analyze and cue movement patterns, analyze and modify body mechanics/ergonomics, assess and modify postural abnormalities, and address imbalance/dizziness to attain remaining goals.      []  See Plan of Care  [x]  See progress note/recertification  []  See Discharge Summary         Progress towards goals / Updated goals:     Short Term Goals: To be accomplished in 8 treatments:               1. Pt will be independent and compliant with HEP. - MET               2. Pt will improve CKC dorsiflexion ROM from 17 to >/= 22 degrees to improve performance of functional activities. - MET               3. Pt will be able to maintain tandem stance on NC surface bilaterally >/= 20 seconds demonstrating improved foot/ankle strategy. - Progressing  Long Term Goals: To be accomplished in 16 treatments:               1. Pt will be independent and compliant with HEP. 2. Pt will improve FOTO score by the MCID from 41 to >/= 62 demonstrating improved overall function with decreased pain or discomfort. 3. Pt will improve CKC dorsiflexion ROM from 17 to >/= 27 degrees to improve performance of functional activities. - Progressing               4. Pt will be able to maintain tandem stance on NC surface bilaterally >/= 30 seconds demonstrating improved foot/ankle strategy. - Progressing               5. Pt will be able to perform SLS on NC surface >/= 10 seconds demonstrating improved foot/ankle strategy. - MET               6. Pt will be able to descend stairs in a reciprocal pattern without needing to turn backwards. - Progressing               7. Pt will be able to walk >/= 30 minutes before requiring a rest break secondary to pain or discomfort.  - Progressing    PLAN  [x]  Upgrade activities as tolerated     [x]  Continue plan of care  []  Update interventions per flow sheet       []  Discharge due to:_  []  Other:_      Meera Espino, PT, DPT 9/20/2022

## 2022-09-22 ENCOUNTER — HOSPITAL ENCOUNTER (OUTPATIENT)
Dept: PHYSICAL THERAPY | Age: 72
Discharge: HOME | End: 2022-09-22
Payer: COMMERCIAL

## 2022-09-22 PROCEDURE — 97140 MANUAL THERAPY 1/> REGIONS: CPT

## 2022-09-22 PROCEDURE — 97016 VASOPNEUMATIC DEVICE THERAPY: CPT

## 2022-09-22 PROCEDURE — 97110 THERAPEUTIC EXERCISES: CPT

## 2022-09-22 PROCEDURE — 97035 APP MDLTY 1+ULTRASOUND EA 15: CPT

## 2022-09-22 NOTE — PROGRESS NOTES
PT DAILY TREATMENT NOTE - [facility] 2-15    Patient Name: Artie Briggs  Date:2022  : 1950  [x]  Patient  Verified  Payor: Zaynab Congress / Plan: [address] HMO / Product Type: HMO /    In time: 11:25 A Out time: 12:20 P  Total Treatment Time (min): 55  Total Timed Codes (min): 45  1:1 Treatment Time ( only): --   Visit #: 12    Treatment Area: Pain in left ankle and joints of left foot [M25.572]    SUBJECTIVE  Pain Level (0-10 scale): 6  Any medication changes, allergies to medications, adverse drug reactions, diagnosis change, or new procedure performed?: [x] No    [] Yes (see summary sheet for update)  Subjective functional status/changes:   [] No changes reported  Pt reports still inflamed and irritated in left foot and posterior to lateral malleolus. He has not been stretching in recent days because of increased sharp pain behind lateral malleolus. OBJECTIVE    Modality rationale: decrease inflammation and decrease pain to improve the patients ability to ambulate with decreased pain or discomfort.     Min Type Additional Details       [] Estim: []Att   []Unatt    []TENS instruct                  []IFC  []Premod   []NMES                     []Other:  []w/US   []w/ice   []w/heat  Position:  Location:       []  Traction: [] Cervical       []Lumbar                       [] Prone          []Supine                       []Intermittent   []Continuous Lbs:  [] before manual  [] after manual  []w/heat   8 [x]  Ultrasound: []Continuous   [] Pulsed                       at: []1MHz   [x]3MHz Location: L peroneal tendons  W/cm2: 1.3    [] Paraffin         Location:   []w/heat    []  Ice     []  Heat  []  Ice massage Position:   Location:    []  Laser  []  Other: Position:  Location:   10   [x]  Vasopneumatic Device  Pressure:       [] lo [x] med [] hi   Temperature: 34     [x] Skin assessment post-treatment:  [x]intact []redness- no adverse reaction    []redness - adverse reaction:     29 min Therapeutic Exercise:  [x] See flow sheet : DF PROM   Rationale: increase ROM and increase strength to improve the patients ability to ambulate with decreased pain or discomfort. 8 min Manual Therapy: Cuboid mobilizations in prone; cross-friction of peroneal tendon   Rationale: decrease pain, increase ROM, and increase tissue extensibility to improve the patients ability to perform ADLs with decreased pain or discomfort. With   [] TE   [] TA   [] Neuro   [] SC   [] other: Patient Education: [x] Review HEP    [] Progressed/Changed HEP based on:   [] positioning   [] body mechanics   [] transfers   [] heat/ice application    [] other:      Other Objective/Functional Measures: --     Pain Level (0-10 scale) post treatment: 6/10    ASSESSMENT/Changes in Function:   Pt denied any change in pain or symptoms after ultrasound today. Low-grade cuboid mobilization in prone produced cavitation, but also produced no change in pain in WB. Secondary to continued high severity of sharp pain, particularly at 5th metatarsal head and along peroneal tendons (which is bilateral L>R), recommended setting up follow-up appt with surgeon for additional imaging/discussion of options, as well as setting up an appt at [facility] for re-assessment of foot biomechanics for custom orthotics. Patient will continue to benefit from skilled PT services to modify and progress therapeutic interventions, address functional mobility deficits, address ROM deficits, address strength deficits, analyze and address soft tissue restrictions, analyze and cue movement patterns, analyze and modify body mechanics/ergonomics, assess and modify postural abnormalities, and address imbalance/dizziness to attain remaining goals. []  See Plan of Care  [x]  See progress note/recertification  []  See Discharge Summary         Progress towards goals / Updated goals:     Short Term Goals:  To be accomplished in 8 treatments:               1. Pt will be independent and compliant with HEP. - MET               2. Pt will improve CKC dorsiflexion ROM from 17 to >/= 22 degrees to improve performance of functional activities. - MET               3. Pt will be able to maintain tandem stance on NC surface bilaterally >/= 20 seconds demonstrating improved foot/ankle strategy. - Progressing  Long Term Goals: To be accomplished in 16 treatments:               1. Pt will be independent and compliant with HEP. 2. Pt will improve FOTO score by the MCID from 41 to >/= 62 demonstrating improved overall function with decreased pain or discomfort. 3. Pt will improve CKC dorsiflexion ROM from 17 to >/= 27 degrees to improve performance of functional activities. - Progressing               4. Pt will be able to maintain tandem stance on NC surface bilaterally >/= 30 seconds demonstrating improved foot/ankle strategy. - Progressing               5. Pt will be able to perform SLS on NC surface >/= 10 seconds demonstrating improved foot/ankle strategy. - MET               6. Pt will be able to descend stairs in a reciprocal pattern without needing to turn backwards. - Progressing               7. Pt will be able to walk >/= 30 minutes before requiring a rest break secondary to pain or discomfort.  - Progressing    PLAN  [x]  Upgrade activities as tolerated     [x]  Continue plan of care  []  Update interventions per flow sheet       []  Discharge due to:_  []  Other:_      Kathe Lemus, PT, DPT 9/22/2022

## 2022-09-26 ENCOUNTER — APPOINTMENT (OUTPATIENT)
Dept: PHYSICAL THERAPY | Age: 72
End: 2022-09-26
Payer: COMMERCIAL

## 2022-09-29 ENCOUNTER — APPOINTMENT (OUTPATIENT)
Dept: PHYSICAL THERAPY | Age: 72
End: 2022-09-29
Payer: COMMERCIAL

## 2022-10-02 ENCOUNTER — TRANSCRIBE ORDER (OUTPATIENT)
Dept: SCHEDULING | Age: 72
End: 2022-10-02

## 2022-10-02 DIAGNOSIS — M54.6 PAIN IN THORACIC SPINE: ICD-10-CM

## 2022-10-02 DIAGNOSIS — M54.16 RADICULOPATHY, LUMBAR REGION: Primary | ICD-10-CM

## 2022-10-06 ENCOUNTER — APPOINTMENT (OUTPATIENT)
Dept: PHYSICAL THERAPY | Age: 72
End: 2022-10-06

## 2023-04-21 DIAGNOSIS — M54.6 PAIN IN THORACIC SPINE: ICD-10-CM

## 2023-04-21 DIAGNOSIS — M54.16 RADICULOPATHY, LUMBAR REGION: Primary | ICD-10-CM

## 2024-11-04 ENCOUNTER — HOSPITAL ENCOUNTER (OUTPATIENT)
Facility: HOSPITAL | Age: 74
Discharge: HOME | End: 2024-11-07
Attending: OTOLARYNGOLOGY
Payer: COMMERCIAL

## 2024-11-04 DIAGNOSIS — J32.0 CHRONIC MAXILLARY SINUSITIS: ICD-10-CM

## 2024-11-04 PROCEDURE — 70486 CT MAXILLOFACIAL W/O DYE: CPT

## (undated) DEVICE — REM POLYHESIVE ADULT PATIENT RETURN ELECTRODE: Brand: VALLEYLAB

## (undated) DEVICE — NEEDLE HYPO 18GA L1.5IN PNK S STL HUB POLYPR SHLD REG BVL

## (undated) DEVICE — SURGICAL PROCEDURE PACK BASIN MAJ SET CUST NO CAUT

## (undated) DEVICE — POOLE SUCTION INSTRUMENT WITH REMOVABLE SHEATH: Brand: POOLE

## (undated) DEVICE — TOWEL SURG W17XL27IN STD BLU COT NONFENESTRATED PREWASHED

## (undated) DEVICE — HANDLE LT SNAP ON ULT DURABLE LENS FOR TRUMPF ALC DISPOSABLE

## (undated) DEVICE — SUT CHRMC 3-0 27IN SH BRN --

## (undated) DEVICE — DBD-PACK,LAPAROTOMY,2 REINFORCED GOWNS: Brand: MEDLINE

## (undated) DEVICE — BLADE ASSEMB CLP HAIR FINE --

## (undated) DEVICE — SYR 50ML SLIP TIP NSAF LF STRL --

## (undated) DEVICE — CONTAINER SPEC 20 ML LID NEUT BUFF FORMALIN 10 % POLYPR STS

## (undated) DEVICE — 1200 GUARD II KIT W/5MM TUBE W/O VAC TUBE: Brand: GUARDIAN

## (undated) DEVICE — NEONATAL-ADULT SPO2 SENSOR: Brand: NELLCOR

## (undated) DEVICE — SOLUTION IV 1000ML 0.9% SOD CHL

## (undated) DEVICE — HOOK RETRCT L5MM E SHRP SELF RET SYS LONE STAR

## (undated) DEVICE — Z DISCONTINUED USE 2751540 TUBING IRRIG L10IN DISP PMP ENDOGATOR

## (undated) DEVICE — KENDALL RADIOLUCENT FOAM MONITORING ELECTRODE -RECTANGULAR SHAPE: Brand: KENDALL

## (undated) DEVICE — Device: Brand: MEDICAL ACTION INDUSTRIES

## (undated) DEVICE — NEEDLE HYPO 25GA L1.5IN BVL ORIENTED ECLIPSE

## (undated) DEVICE — CONNECTOR TBNG AUX H2O JET DISP FOR OLY 160/180 SER

## (undated) DEVICE — STERILE POLYISOPRENE POWDER-FREE SURGICAL GLOVES: Brand: PROTEXIS

## (undated) DEVICE — SYR 10ML LUER LOK 1/5ML GRAD --

## (undated) DEVICE — BAG SPEC BIOHZD LF 2MIL 6X10IN -- CONVERT TO ITEM 357326

## (undated) DEVICE — GAUZE SPONGES,12 PLY: Brand: CURITY

## (undated) DEVICE — BW-412T DISP COMBO CLEANING BRUSH: Brand: SINGLE USE COMBINATION CLEANING BRUSH

## (undated) DEVICE — CATH IV AUTOGRD BC BLU 22GA 25 -- INSYTE

## (undated) DEVICE — INFECTION CONTROL KIT SYS

## (undated) DEVICE — BAG BELONG PT PERS CLEAR HANDL

## (undated) DEVICE — SURGIFOAM SPNG SZ 100

## (undated) DEVICE — SNARE ENDOSCP M L240CM W27MM SHTH DIA2.4MM CHN 2.8MM OVL

## (undated) DEVICE — Device

## (undated) DEVICE — JELLY,LUBE,STERILE,FLIP TOP,TUBE,4-OZ: Brand: MEDLINE

## (undated) DEVICE — TRAP SURG QUAD PARABOLA SLOT DSGN SFTY SCRN TRAPEASE

## (undated) DEVICE — QUILTED PREMIUM COMFORT UNDERPAD,EXTRA HEAVY: Brand: WINGS

## (undated) DEVICE — AIRLIFE™ U/CONNECT-IT OXYGEN TUBING 7 FEET (2.1 M) CRUSH-RESISTANT OXYGEN TUBING, VINYL TIPPED: Brand: AIRLIFE™

## (undated) DEVICE — SET ADMIN 16ML TBNG L100IN 2 Y INJ SITE IV PIGGY BK DISP

## (undated) DEVICE — SUTURE PROL SZ 2-0 L36IN NONABSORBABLE BLU SH L26MM 1/2 CIR 8523H

## (undated) DEVICE — MICRODISSECTION NEEDLE STRAIGHT SLEEVE: Brand: COLORADO

## (undated) DEVICE — Z DISCONTINUED NO SUB IDED SET EXTN W/ 4 W STPCOCK M SPIN LOK 36IN

## (undated) DEVICE — SOLIDIFIER FLUID 3000 CC ABSORB

## (undated) DEVICE — CANN NASAL O2 CAPNOGRAPHY AD -- FILTERLINE

## (undated) DEVICE — SUTURE VCRL SZ 2-0 L27IN ABSRB VLT L26MM SH 1/2 CIR J317H

## (undated) DEVICE — DEVON™ KNEE AND BODY STRAP 60" X 3" (1.5 M X 7.6 CM): Brand: DEVON

## (undated) DEVICE — SYRINGE MED 20ML STD CLR PLAS LUERLOCK TIP N CTRL DISP

## (undated) DEVICE — ENDO CARRY-ON PROCEDURE KIT INCLUDES ENZYMATIC SPONGE, GAUZE, BIOHAZARD LABEL, TRAY, LUBRICANT, DIRTY SCOPE LABEL, WATER LABEL, TRAY, DRAWSTRING PAD, AND DEFENDO 4-PIECE KIT.: Brand: ENDO CARRY-ON PROCEDURE KIT

## (undated) DEVICE — 3M™ DURAPORE™ SURGICAL TAPE 1538-3, 3 INCH X 10 YARD (7,5CM X 9,1M), 4 ROLLS/BOX: Brand: 3M™ DURAPORE™